# Patient Record
Sex: MALE | Race: WHITE | NOT HISPANIC OR LATINO | URBAN - METROPOLITAN AREA
[De-identification: names, ages, dates, MRNs, and addresses within clinical notes are randomized per-mention and may not be internally consistent; named-entity substitution may affect disease eponyms.]

---

## 2020-03-14 ENCOUNTER — TRANSFERRED RECORDS (OUTPATIENT)
Dept: HEALTH INFORMATION MANAGEMENT | Facility: CLINIC | Age: 45
End: 2020-03-14

## 2020-09-14 ENCOUNTER — TRANSFERRED RECORDS (OUTPATIENT)
Dept: HEALTH INFORMATION MANAGEMENT | Facility: CLINIC | Age: 45
End: 2020-09-14

## 2020-09-14 LAB
ALT SERPL-CCNC: 14 IU/L (ref 7–52)
AST SERPL-CCNC: 10 IU/L (ref 13–39)
CREATININE (EXTERNAL): 0.79 MG/DL (ref 0.7–1.3)
GFR ESTIMATED (EXTERNAL): >90 ML/MIN/1.73M2
GFR ESTIMATED (IF AFRICAN AMERICAN) (EXTERNAL): >90 ML/MIN/1.73M2
GLUCOSE (EXTERNAL): 97 MG/DL (ref 70–105)
POTASSIUM (EXTERNAL): 2.9 MEQ/L (ref 3.5–5.1)

## 2020-09-30 ENCOUNTER — TRANSFERRED RECORDS (OUTPATIENT)
Dept: HEALTH INFORMATION MANAGEMENT | Facility: CLINIC | Age: 45
End: 2020-09-30

## 2020-11-23 ENCOUNTER — TRANSFERRED RECORDS (OUTPATIENT)
Dept: HEALTH INFORMATION MANAGEMENT | Facility: CLINIC | Age: 45
End: 2020-11-23

## 2021-09-29 ENCOUNTER — TRANSFERRED RECORDS (OUTPATIENT)
Dept: HEALTH INFORMATION MANAGEMENT | Facility: CLINIC | Age: 46
End: 2021-09-29

## 2022-02-03 ENCOUNTER — MEDICAL CORRESPONDENCE (OUTPATIENT)
Dept: HEALTH INFORMATION MANAGEMENT | Facility: CLINIC | Age: 47
End: 2022-02-03

## 2022-02-11 ENCOUNTER — TRANSFERRED RECORDS (OUTPATIENT)
Dept: HEALTH INFORMATION MANAGEMENT | Facility: CLINIC | Age: 47
End: 2022-02-11

## 2022-03-08 ENCOUNTER — MEDICAL CORRESPONDENCE (OUTPATIENT)
Dept: HEALTH INFORMATION MANAGEMENT | Facility: CLINIC | Age: 47
End: 2022-03-08

## 2022-03-17 ENCOUNTER — TRANSFERRED RECORDS (OUTPATIENT)
Dept: HEALTH INFORMATION MANAGEMENT | Facility: CLINIC | Age: 47
End: 2022-03-17

## 2022-03-24 ENCOUNTER — TRANSFERRED RECORDS (OUTPATIENT)
Dept: HEALTH INFORMATION MANAGEMENT | Facility: CLINIC | Age: 47
End: 2022-03-24

## 2022-03-24 LAB
ALT SERPL-CCNC: 7 IU/L (ref 7–52)
AST SERPL-CCNC: 8 IU/L (ref 13–39)
CREATININE (EXTERNAL): 0.85 MG/DL (ref 0.7–1.3)
GFR ESTIMATED (EXTERNAL): >90 ML/MIN/1.73M2
GFR ESTIMATED (IF AFRICAN AMERICAN) (EXTERNAL): >90 ML/MIN/1.73M2
GLUCOSE (EXTERNAL): 80 MG/DL (ref 70–105)
POTASSIUM (EXTERNAL): 3.4 MEQ/L (ref 3.5–5.1)

## 2022-03-27 ENCOUNTER — MEDICAL CORRESPONDENCE (OUTPATIENT)
Dept: HEALTH INFORMATION MANAGEMENT | Facility: CLINIC | Age: 47
End: 2022-03-27

## 2022-04-06 ENCOUNTER — TRANSCRIBE ORDERS (OUTPATIENT)
Dept: OTHER | Age: 47
End: 2022-04-06

## 2022-04-06 DIAGNOSIS — R93.3 ABNORMAL FINDINGS ON DIAGNOSTIC IMAGING OF OTHER PARTS OF DIGESTIVE TRACT: Primary | ICD-10-CM

## 2022-04-07 ENCOUNTER — TRANSFERRED RECORDS (OUTPATIENT)
Dept: HEALTH INFORMATION MANAGEMENT | Facility: CLINIC | Age: 47
End: 2022-04-07

## 2022-04-08 ENCOUNTER — MEDICAL CORRESPONDENCE (OUTPATIENT)
Dept: HEALTH INFORMATION MANAGEMENT | Facility: CLINIC | Age: 47
End: 2022-04-08

## 2022-07-14 NOTE — TELEPHONE ENCOUNTER
REFERRAL INFORMATION:    Referring Provider:  Corry    Referring Clinic:  [facility]    Reason for Visit/Diagnosis: abnormal findings on digestive tract     FUTURE VISIT INFORMATION:    Appointment Date: 7.26.22    Appointment Time: 3 PM     NOTES STATUS DETAILS   OFFICE NOTE from Referring Provider Received [facility]  4.7.22  2.11.22   OFFICE NOTE from Other Specialist N/A    HOSPITAL DISCHARGE SUMMARY/  ED VISITS N/A    OPERATIVE REPORT N/A    MEDICATION LIST Received         ENDOSCOPY  Received 9.30.20   COLONOSCOPY Received 9.30.20   ERCP N/A    EUS N/A    STOOL TESTING N/A    PERTINENT LABS Received    PATHOLOGY REPORTS (RELATED) Received 9.30.20  3.17.22   IMAGING (CT, MRI, EGD, MRCP, Small Bowel Follow Through/SBT, MR/CT Enterography) Received  Enterography 11.23.20  MR 2.25.22     Action 7.14.22 MJ   Action Taken Called [facility] [phone] and received [employer] fax number [phone] . Requested images.      7/15/2022 1:05pm Images from [facility] resolved into PACS. Mónica

## 2022-07-18 ENCOUNTER — PATIENT OUTREACH (OUTPATIENT)
Dept: GASTROENTEROLOGY | Facility: CLINIC | Age: 47
End: 2022-07-18

## 2022-07-18 NOTE — TELEPHONE ENCOUNTER
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

## 2022-07-25 NOTE — TELEPHONE ENCOUNTER
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 I am working on the issues and will follow up with them tomorrow. The slides and images will not be here in time for the Pt's appt. Please let me know if you have any questions    Patient has appointment on July 26   Will go ahead with the appt

## 2022-07-26 ENCOUNTER — OFFICE VISIT (OUTPATIENT)
Dept: GASTROENTEROLOGY | Facility: CLINIC | Age: 47
End: 2022-07-26
Payer: COMMERCIAL

## 2022-07-26 ENCOUNTER — LAB (OUTPATIENT)
Dept: LAB | Facility: CLINIC | Age: 47
End: 2022-07-26
Payer: COMMERCIAL

## 2022-07-26 ENCOUNTER — PRE VISIT (OUTPATIENT)
Dept: GASTROENTEROLOGY | Facility: CLINIC | Age: 47
End: 2022-07-26

## 2022-07-26 VITALS
HEART RATE: 74 BPM | DIASTOLIC BLOOD PRESSURE: 70 MMHG | TEMPERATURE: 98.7 F | WEIGHT: 213.7 LBS | SYSTOLIC BLOOD PRESSURE: 116 MMHG | BODY MASS INDEX: 30.59 KG/M2 | HEIGHT: 70 IN

## 2022-07-26 DIAGNOSIS — R19.7 DIARRHEA, UNSPECIFIED TYPE: Primary | ICD-10-CM

## 2022-07-26 DIAGNOSIS — R19.7 DIARRHEA, UNSPECIFIED TYPE: ICD-10-CM

## 2022-07-26 DIAGNOSIS — R93.3 ABNORMAL FINDINGS ON DIAGNOSTIC IMAGING OF OTHER PARTS OF DIGESTIVE TRACT: ICD-10-CM

## 2022-07-26 LAB
ALBUMIN SERPL-MCNC: 3.3 G/DL (ref 3.4–5)
ALP SERPL-CCNC: 63 U/L (ref 40–150)
ALT SERPL W P-5'-P-CCNC: 14 U/L (ref 0–70)
ANION GAP SERPL CALCULATED.3IONS-SCNC: <1 MMOL/L (ref 3–14)
AST SERPL W P-5'-P-CCNC: 8 U/L (ref 0–45)
BASOPHILS # BLD AUTO: 0.1 10E3/UL (ref 0–0.2)
BASOPHILS NFR BLD AUTO: 1 %
BILIRUB SERPL-MCNC: 0.5 MG/DL (ref 0.2–1.3)
BUN SERPL-MCNC: 12 MG/DL (ref 7–30)
CALCIUM SERPL-MCNC: 8.5 MG/DL (ref 8.5–10.1)
CHLORIDE BLD-SCNC: 111 MMOL/L (ref 94–109)
CO2 SERPL-SCNC: 29 MMOL/L (ref 20–32)
CREAT SERPL-MCNC: 0.85 MG/DL (ref 0.66–1.25)
CRP SERPL-MCNC: 5.2 MG/L (ref 0–8)
EOSINOPHIL # BLD AUTO: 0.3 10E3/UL (ref 0–0.7)
EOSINOPHIL NFR BLD AUTO: 2 %
ERYTHROCYTE [DISTWIDTH] IN BLOOD BY AUTOMATED COUNT: 14 % (ref 10–15)
ERYTHROCYTE [SEDIMENTATION RATE] IN BLOOD BY WESTERGREN METHOD: 8 MM/HR (ref 0–15)
FERRITIN SERPL-MCNC: 30 NG/ML (ref 26–388)
GFR SERPL CREATININE-BSD FRML MDRD: >90 ML/MIN/1.73M2
GLUCOSE BLD-MCNC: 94 MG/DL (ref 70–99)
HCT VFR BLD AUTO: 42.6 % (ref 40–53)
HGB BLD-MCNC: 13.5 G/DL (ref 13.3–17.7)
IMM GRANULOCYTES # BLD: 0.1 10E3/UL
IMM GRANULOCYTES NFR BLD: 1 %
INR PPP: 0.97 (ref 0.85–1.15)
IRON SATN MFR SERPL: 17 % (ref 15–46)
IRON SERPL-MCNC: 44 UG/DL (ref 35–180)
LYMPHOCYTES # BLD AUTO: 1.8 10E3/UL (ref 0.8–5.3)
LYMPHOCYTES NFR BLD AUTO: 16 %
MCH RBC QN AUTO: 25.3 PG (ref 26.5–33)
MCHC RBC AUTO-ENTMCNC: 31.7 G/DL (ref 31.5–36.5)
MCV RBC AUTO: 80 FL (ref 78–100)
MONOCYTES # BLD AUTO: 0.5 10E3/UL (ref 0–1.3)
MONOCYTES NFR BLD AUTO: 5 %
NEUTROPHILS # BLD AUTO: 8.5 10E3/UL (ref 1.6–8.3)
NEUTROPHILS NFR BLD AUTO: 75 %
NRBC # BLD AUTO: 0 10E3/UL
NRBC BLD AUTO-RTO: 0 /100
PLATELET # BLD AUTO: 168 10E3/UL (ref 150–450)
POTASSIUM BLD-SCNC: 3.7 MMOL/L (ref 3.4–5.3)
PROT SERPL-MCNC: 6.1 G/DL (ref 6.8–8.8)
RBC # BLD AUTO: 5.33 10E6/UL (ref 4.4–5.9)
SODIUM SERPL-SCNC: 137 MMOL/L (ref 133–144)
TIBC SERPL-MCNC: 260 UG/DL (ref 240–430)
TSH SERPL DL<=0.005 MIU/L-ACNC: 0.7 MU/L (ref 0.4–4)
WBC # BLD AUTO: 11.3 10E3/UL (ref 4–11)

## 2022-07-26 PROCEDURE — 86481 TB AG RESPONSE T-CELL SUSP: CPT | Mod: 90 | Performed by: PATHOLOGY

## 2022-07-26 PROCEDURE — 80050 GENERAL HEALTH PANEL: CPT | Performed by: PATHOLOGY

## 2022-07-26 PROCEDURE — 83550 IRON BINDING TEST: CPT | Performed by: PATHOLOGY

## 2022-07-26 PROCEDURE — 85652 RBC SED RATE AUTOMATED: CPT | Performed by: PATHOLOGY

## 2022-07-26 PROCEDURE — 82306 VITAMIN D 25 HYDROXY: CPT | Mod: 90 | Performed by: PATHOLOGY

## 2022-07-26 PROCEDURE — 87340 HEPATITIS B SURFACE AG IA: CPT | Mod: 90 | Performed by: PATHOLOGY

## 2022-07-26 PROCEDURE — 84446 ASSAY OF VITAMIN E: CPT | Mod: 90 | Performed by: PATHOLOGY

## 2022-07-26 PROCEDURE — 82728 ASSAY OF FERRITIN: CPT | Performed by: PATHOLOGY

## 2022-07-26 PROCEDURE — 99204 OFFICE O/P NEW MOD 45 MIN: CPT | Mod: GC | Performed by: STUDENT IN AN ORGANIZED HEALTH CARE EDUCATION/TRAINING PROGRAM

## 2022-07-26 PROCEDURE — 84590 ASSAY OF VITAMIN A: CPT | Mod: 90 | Performed by: PATHOLOGY

## 2022-07-26 PROCEDURE — 86140 C-REACTIVE PROTEIN: CPT | Performed by: PATHOLOGY

## 2022-07-26 PROCEDURE — 85610 PROTHROMBIN TIME: CPT | Performed by: PATHOLOGY

## 2022-07-26 PROCEDURE — 82657 ENZYME CELL ACTIVITY: CPT | Mod: 90 | Performed by: PATHOLOGY

## 2022-07-26 PROCEDURE — 86706 HEP B SURFACE ANTIBODY: CPT | Mod: 90 | Performed by: PATHOLOGY

## 2022-07-26 PROCEDURE — 36415 COLL VENOUS BLD VENIPUNCTURE: CPT | Performed by: PATHOLOGY

## 2022-07-26 PROCEDURE — 99000 SPECIMEN HANDLING OFFICE-LAB: CPT | Performed by: PATHOLOGY

## 2022-07-26 PROCEDURE — 86704 HEP B CORE ANTIBODY TOTAL: CPT | Mod: 90 | Performed by: PATHOLOGY

## 2022-07-26 RX ORDER — CITALOPRAM HYDROBROMIDE 40 MG/1
TABLET ORAL
COMMUNITY
Start: 2021-07-29 | End: 2022-07-30

## 2022-07-26 RX ORDER — VITAMIN B COMPLEX
TABLET ORAL
COMMUNITY
Start: 2022-06-16 | End: 2022-07-30

## 2022-07-26 RX ORDER — BUDESONIDE 3 MG/1
CAPSULE, COATED PELLETS ORAL
COMMUNITY
Start: 2022-03-17 | End: 2023-03-18

## 2022-07-26 RX ORDER — ATENOLOL 50 MG/1
TABLET ORAL
COMMUNITY
Start: 2022-06-21

## 2022-07-26 RX ORDER — HYDROCHLOROTHIAZIDE 25 MG/1
TABLET ORAL
COMMUNITY
Start: 2022-06-16 | End: 2022-07-30

## 2022-07-26 RX ORDER — CHOLESTYRAMINE 4 G/5.5G
POWDER, FOR SUSPENSION ORAL
COMMUNITY
Start: 2022-02-11 | End: 2022-11-15

## 2022-07-26 RX ORDER — POTASSIUM CHLORIDE 1500 MG/1
TABLET, EXTENDED RELEASE ORAL
COMMUNITY
Start: 2021-07-29 | End: 2022-07-30

## 2022-07-26 RX ORDER — CHLORAL HYDRATE 500 MG
2000 CAPSULE ORAL
COMMUNITY
Start: 2022-06-16

## 2022-07-26 RX ORDER — IBUPROFEN 800 MG/1
TABLET, FILM COATED ORAL
COMMUNITY
Start: 2022-03-16 | End: 2023-03-18

## 2022-07-26 ASSESSMENT — PAIN SCALES - GENERAL: PAINLEVEL: NO PAIN (0)

## 2022-07-26 NOTE — NURSING NOTE
"Chief Complaint   Patient presents with     New Patient     Crohn's?       Vitals:    07/26/22 1332   BP: 116/70   BP Location: Left arm   Patient Position: Sitting   Cuff Size: Adult Large   Pulse: 74   Temp: 98.7  F (37.1  C)   Weight: 96.9 kg (213 lb 11.2 oz)   Height: 1.778 m (5' 10\")       Body mass index is 30.66 kg/m .      Dia Rubio LPN                      "

## 2022-07-26 NOTE — PATIENT INSTRUCTIONS
Mr. Amato,    It was a pleasure seeing you in the GI clinic today at the [facility]. Please see below for our plan.     Blood work today in clinic, the lab is on the 1st floor of this building  Stool/fecal labs today, we will give you two containers to take home, if you are able to produce a stool before you leave for [place], you can drop these back off at the clinic building in the morning, otherwise we will arrange to have them dropped off in [place]  We would like to schedule a colonoscopy here at the [facility] with Dr. Montoya, our clinic nurse manager will call you to help arrange this  We would like you to have a special MRI that takes a very close look at your small intestines  We would like to taper you from your budesonide as follows:  9 mg (three pills) per day x 1 week, 6 mg (two pills) per day x 4 weeks, 3 mg (one pill) per day x 4 weeks  We will follow up in clinic in the next 4 months when all of this of is complete and we will go over the results    Please do not hesitate to reach out with any questions or concerns,     Best,  Dr. Todd and Dr. Montoya       ==========  It was a pleasure seeing you in clinic today - please be in touch if there are any further questions that arise following today's visit.  During business hours, you may reach my Clinic Coordinator at [phone].  For urgent/emergent questions after business hours, you may reach the on-call GI Fellow by contacting the [facility]  at [phone].    Any benign/non-urgent test results are usually communicated via letter or [employer] message within 1-2 weeks after completion.  Urgent results (those that require a change in the previously-discussed care plan) are usually communicated via a phone call once available from our clinic staff to discuss the results and the next steps in your evaluation.    I recommend signing up for [employer] access if you have not already done so and are comfortable  with using a computer.  This allows for online access to your lab results and also helps you communicate efficiently with my clinic should any questions arise in your care.

## 2022-07-26 NOTE — PROGRESS NOTES
[facility] Gastroenterology Clinic:     New Consult: question of Crohn's    Date of visit: 7/26/2022     Referring provider: Sujata Wheatley    CC: 47 year old male referred by Sujata Wheatley for evaluation of possible IBD    ASSESSMENT AND PLAN:    #. Chronic diarrhea  #. Abdominal pain  #. Ileitis on imaging  Taken together, CTE findings of inflammation near ileum, chronic diarrhea and abdominal pain, and biopsies with immune infiltrate are most suspicious for new IBD, likely CD. Differential also includes NSAID enteropathy, lymphocytic enteropathy, malabsorption, pancreatic exocrine insufficiency, osmotic diarrhea from diet. We will move forward with broad laboratory, imaging, and endoscopic work up with particular attention to small bowel disease.   -- labs: CBC, CMP, iron panel, vitamins D, A, E, INR, TSH  -- stool testing: fecal calprotectin, fecal lactoferrin  -- MR enterography  -- Colonoscopy with Dr. Montoya  -- budesonide taper: 9mg, 6 mg, 3 mg, off  -- continue loperamide and soluble fiber  -- follow up in GI clinic in 4 months, sooner PRN    Return to Clinic: 4 months    Antoni Todd MD, PGY5  Gastroenterology Fellow  [facility]  See [facility]/Isabel for GI on-call information    Patient discussed and seen with Gastroenterology staff Dr. Montoya, who is in agreement with the above.     ====================================================================================================================================  HPI:     Mr. Amato presents today for evaluation of chronic diarrhea, abdominal pain, and endoscopic/imaging findings of ileitis.     Diarrhea for 5+ years, no GI symptoms or illness prior to that.     Bowel movement behavior:  - severity unchanged in last 5 years  - frequency: 2-3 times per day  - nocturnal: not typically  - blood: none  - dyschezia: none  - abdominal pain: during and prior to BMs, some small relief with passage of BMs  - fecal incontinence: once every other  month  - stool consistency: water with solid pieces  - associated symptoms: (-) nausea, vomiting, dysphagia, constipated, (+) weight loss ~ 20 lbs    Imodium (loperamide generic) 4 mg in AM, 2mg PM as needed.Recent course of budesonide, 9mg per day, started 3/2022. One prior course a few years ago, has been helpful.     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 Is 100% service covered for his [facility] health eligibility. Some radioactive exposure during explosive detection with certain devices.     Patient consents for [facility] for [facility] of care.     Family history:  - IBD: negative   - CRC: negative  - Celiac: negative  -- Autoimmune disease: negative    IBD ROS:  - Negative: joint pain, rashes, eye redness, oral sores, vision changes    Social:  - Tobacco: 1.5 ppd x 2 years, quit for 3 years, total 27-28 years  - EtOH: < 5 drinks per month  - No recreational drug use  - Works in oil field, [employer]  - Lives: [place]    NSAIDs: uses 800 mg ibuprofen PRN for shoulder and back pain, less than 3 times per week they are actually used    Diet: simply carbohydrates, though no dense source of sugar alcohols or artificial sweeteners    Abdominal surgery history: none    Targeted GI review of systems complete and is otherwise negative.     No past medical history on file.    No past surgical history on file.    No family history on file.    Social History     Tobacco Use     Smoking status: Not on file     Smokeless tobacco: Not on file   Substance Use Topics     Alcohol use: Not on file     Physical exam:     Vitals:There were no vitals taken for this visit.   BMI: There is no height or weight on file to calculate BMI.      GEN: NAD, A&Ox3, appropriate  HEENT: EOMI, PERRLA, MMM, hearing grossly intact  Neck: full ROM  Cardiopulmonary: non labored, comfortable on RA, nondiaphoretic, no LE edema  Abdominal: soft, tender in lower quadrants, R>L  nondistended, no HSM, no rebound, no guarding, normoactive BS  Skin: no jaundice, no gross lesions on visible skin  Neuro: A&Ox3, grossly intact motor/sensation, gait intact  MSK: no gross deformity, moves arms/legs equally  Psych: normal affect, congruent with mood      Labs:   Reviewed.     Relevant imaging:   CTE: abnormal mural hyperenhancementt of distal ileum and associated mesentery/LAD    Endoscopy:  Colonoscopy 3/2022: with possible thickening/inflammation in ileum    Pathology:   - TI biopsy from above colonoscopy: lymphocytic infiltrate, rectum and cecum normal    Relevant surgical reports:   N/A

## 2022-07-26 NOTE — LETTER
7/26/2022         RE: Jacinda Amato  [address] [facility] [address]        Dear Colleague,    Thank you for referring your patient, Jacinda Amato, to the [facility]STR[facility] [place]. Please see a copy of my visit note below.    [facility] Gastroenterology Clinic:     New Consult: question of Crohn's    Date of visit: 7/26/2022     Referring provider: Sujata Wheatley    CC: 47 year old male referred by Sujata Wheatley for evaluation of possible IBD    ASSESSMENT AND PLAN:    #. Chronic diarrhea  #. Abdominal pain  #. Ileitis on imaging  Taken together, CTE findings of inflammation near ileum, chronic diarrhea and abdominal pain, and biopsies with immune infiltrate are most suspicious for new IBD, likely CD. Differential also includes NSAID enteropathy, lymphocytic enteropathy, malabsorption, pancreatic exocrine insufficiency, osmotic diarrhea from diet. We will move forward with broad laboratory, imaging, and endoscopic work up with particular attention to small bowel disease.   -- labs: CBC, CMP, iron panel, vitamins D, A, E, INR, TSH  -- stool testing: fecal calprotectin, fecal lactoferrin  -- MR enterography  -- Colonoscopy with Dr. Montoya  -- budesonide taper: 9mg, 6 mg, 3 mg, off  -- continue loperamide and soluble fiber  -- follow up in GI clinic in 4 months, sooner PRN    Return to Clinic: 4 months    Antoni Todd MD, PGY5  Gastroenterology Fellow  [facility]  See [facility]abel for GI on-call information    Patient discussed and seen with Gastroenterology staff Dr. Montoya, who is in agreement with the above.     ====================================================================================================================================  HPI:     Mr. Amato presents today for evaluation of chronic diarrhea, abdominal pain, and endoscopic/imaging findings of ileitis.     Diarrhea for 5+ years, no GI symptoms or illness prior to that.     Bowel movement  behavior:  - severity unchanged in last 5 years  - frequency: 2-3 times per day  - nocturnal: not typically  - blood: none  - dyschezia: none  - abdominal pain: during and prior to BMs, some small relief with passage of BMs  - fecal incontinence: once every other month  - stool consistency: water with solid pieces  - associated symptoms: (-) nausea, vomiting, dysphagia, constipated, (+) weight loss ~ 20 lbs    Imodium (loperamide generic) 4 mg in AM, 2mg PM as needed.Recent course of budesonide, 9mg per day, started 3/2022. One prior course a few years ago, has been helpful.     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     Patient consents for me to access [facility]/VA records for continuity of care.     Family history:  - IBD: negative   - CRC: negative  - Celiac: negative  -- Autoimmune disease: negative    IBD ROS:  - Negative: joint pain, rashes, eye redness, oral sores, vision changes    Social:  - Tobacco: 1.5 ppd x 2 years, quit for 3 years, total 27-28 years  - EtOH: < 5 drinks per month  - No recreational drug use  - Works in oil field, [employer]  - Lives: [place]    NSAIDs: uses 800 mg ibuprofen PRN for shoulder and back pain, less than 3 times per week they are actually used    Diet: simply carbohydrates, though no dense source of sugar alcohols or artificial sweeteners    Abdominal surgery history: none    Targeted GI review of systems complete and is otherwise negative.     No past medical history on file.    No past surgical history on file.    No family history on file.    Social History     Tobacco Use     Smoking status: Not on file     Smokeless tobacco: Not on file   Substance Use Topics     Alcohol use: Not on file     Physical exam:     Vitals:There were no vitals taken for this visit.   BMI: There is no height or  weight on file to calculate BMI.      GEN: NAD, A&Ox3, appropriate  HEENT: EOMI, PERRLA, MMM, hearing grossly intact  Neck: full ROM  Cardiopulmonary: non labored, comfortable on RA, nondiaphoretic, no LE edema  Abdominal: soft, tender in lower quadrants, R>L nondistended, no HSM, no rebound, no guarding, normoactive BS  Skin: no jaundice, no gross lesions on visible skin  Neuro: A&Ox3, grossly intact motor/sensation, gait intact  MSK: no gross deformity, moves arms/legs equally  Psych: normal affect, congruent with mood      Labs:   Reviewed.     Relevant imaging:   CTE: abnormal mural hyperenhancementt of distal ileum and associated mesentery/LAD    Endoscopy:  Colonoscopy 3/2022: with possible thickening/inflammation in ileum    Pathology:   - TI biopsy from above colonoscopy: lymphocytic infiltrate, rectum and cecum normal    Relevant surgical reports:   N/A                Attestation signed by Suman Montoya MD at 8/12/2022 12:22 PM:  ATTENDING ATTESTATION:     DATE SEEN: 7/26/22    Patient was discussed, seen, and examined by meSuman. The plan of care and pertinent data/imaging were reviewed with Dr. Todd. I agree with the assessment and plan as delineated above with the following additions:     Obtain labs, stool tests, MRE and colonoscopy. Budesonide taper as outlined.    Please contact me with any further questions.    Thank you for this consultation.  It was a pleasure to participate in the care of this patient; please contact us with any further questions.  I spent a total of 40 minutes during the day of encounter performed chart review, meeting with patient, patient counseling, care coordination, and documentation.     Suman Montoya MD  [facility]  Division of Gastroenterology, Hepatology and Nutrition     Brief GI Telephone Note:    07/27/22    All available labs from 7/27/22 reviewed with patient. Understands results and all questions answered. He is dropping off  fecal calprotectin and stool elastase as we speak.     Next steps in care understood.     Antoni Todd MD, PGY5  Gastroenterology Fellow  [facility]  See [facility]/bryn for GI on-call information      Brief GI Telephone Note:    08/05/22    Unable to reach patient on multiple attempts today to discuss results of fecal calprotectin (elevated).    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    Antoni Todd MD, PGY5  Gastroenterology Fellow  [facility]  See [facility]abel for GI on-call information

## 2022-07-27 LAB
DEPRECATED CALCIDIOL+CALCIFEROL SERPL-MC: 39 UG/L (ref 20–75)
HBV CORE AB SERPL QL IA: NONREACTIVE
HBV SURFACE AB SERPL IA-ACNC: 1 M[IU]/ML
HBV SURFACE AG SERPL QL IA: NONREACTIVE

## 2022-07-27 PROCEDURE — 83993 ASSAY FOR CALPROTECTIN FECAL: CPT | Mod: 90 | Performed by: PATHOLOGY

## 2022-07-27 PROCEDURE — 99000 SPECIMEN HANDLING OFFICE-LAB: CPT | Performed by: PATHOLOGY

## 2022-07-27 PROCEDURE — 82653 EL-1 FECAL QUANTITATIVE: CPT | Mod: 90 | Performed by: PATHOLOGY

## 2022-07-27 NOTE — PROGRESS NOTES
Brief GI Telephone Note:    07/27/22    All available labs from 7/27/22 reviewed with patient. Understands results and all questions answered. He is dropping off fecal calprotectin and stool elastase as we speak.     Next steps in care understood.     Antoni Todd MD, PGY5  Gastroenterology Fellow  [facility]  See [facility]abel for GI on-call information

## 2022-07-28 LAB
GAMMA INTERFERON BACKGROUND BLD IA-ACNC: 0.02 IU/ML
M TB IFN-G BLD-IMP: NEGATIVE
M TB IFN-G CD4+ BCKGRND COR BLD-ACNC: 9.98 IU/ML
MITOGEN IGNF BCKGRD COR BLD-ACNC: -0.01 IU/ML
MITOGEN IGNF BCKGRD COR BLD-ACNC: 0 IU/ML
QUANTIFERON MITOGEN: 10 IU/ML
QUANTIFERON NIL TUBE: 0.02 IU/ML
QUANTIFERON TB1 TUBE: 0.01 IU/ML
QUANTIFERON TB2 TUBE: 0.02

## 2022-07-29 ENCOUNTER — DOCUMENTATION ONLY (OUTPATIENT)
Dept: GASTROENTEROLOGY | Facility: CLINIC | Age: 47
End: 2022-07-29

## 2022-07-29 LAB
A-TOCOPHEROL VIT E SERPL-MCNC: 16.4 MG/L
ANNOTATION COMMENT IMP: NORMAL
BETA+GAMMA TOCOPHEROL SERPL-MCNC: 1.7 MG/L
CALPROTECTIN STL-MCNT: 816 MG/KG (ref 0–49.9)
ELASTASE PANC STL-MCNT: 519 UG/G
RETINYL PALMITATE SERPL-MCNC: 0.02 MG/L
TPMT BLD-CCNC: 32.1 U/ML
VIT A SERPL-MCNC: 0.86 MG/L

## 2022-07-29 NOTE — PROGRESS NOTES
Action 2022 2:30pm -Bhao    Action Taken Fax request sent to [facility] for colored colonoscopy (3/17/2022, 2022) images and Path slides.    [facility]  Medical Records  [address] [place] [address]   [phone]  [employer]         Action 2022 11:39am -Bhao   Action Taken Re-faxed request to [facility] for images and path slides.    [employer]

## 2022-08-02 ENCOUNTER — TELEPHONE (OUTPATIENT)
Dept: GASTROENTEROLOGY | Facility: CLINIC | Age: 47
End: 2022-08-02

## 2022-08-02 NOTE — TELEPHONE ENCOUNTER
Brief GI Phone note:    08/02/22      Attempted to call patient to discuss further results. Left VM. Will attempt later today/tomorrow.     Antoni Todd MD, PGY5  Gastroenterology Fellow  [facility]  See [facility]abel for GI on-call information

## 2022-08-05 NOTE — PROGRESS NOTES
Brief GI Telephone Note:    08/05/22    Unable to reach patient on multiple attempts today to discuss results of fecal calprotectin (elevated).    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    Antoni Todd MD, PGY5  Gastroenterology Fellow  [facility]  See [facility]abel for GI on-call information

## 2022-08-15 ENCOUNTER — TELEPHONE (OUTPATIENT)
Dept: GASTROENTEROLOGY | Facility: CLINIC | Age: 47
End: 2022-08-15

## 2022-08-15 ENCOUNTER — PATIENT OUTREACH (OUTPATIENT)
Dept: GASTROENTEROLOGY | Facility: CLINIC | Age: 47
End: 2022-08-15

## 2022-08-15 NOTE — TELEPHONE ENCOUNTER
Screening Questions    BlueKIND OF PREP RedLOCATION [review exclusion criteria] GreenSEDATION TYPE      1. Are you active on mychart? Y    2. What insurance is in the chart? [facility]     3.   Ordering/Referring Provider: Suman Montoya MD     4. BMI   (If greater than 40 review exclusion criteria [PAC APPT IF [MAC] @ [facility])  30.6  [If yes, BMI OVER 40-EXTENDED PREP]      **(Sedation review/consideration needed)**  Do you have a legal guardian or Medical Power of    and/or are you able to give consent for your medical care?     Y    5. Have you had a positive covid test in the last 90 days?   N -     6.  Are you currently on dialysis?   N [ If yes, G-PREP & HOSPITAL setting ONLY]     7.  Do you have chronic kidney disease?  N [ If yes, G-PREP ]    8.   Do you have a diagnosis of diabetes?   N   [ If yes, G-PREP ]    9.  On a regular basis do you go 3-5 days between bowel movements?   N   [ If yes, EXTENDED PREP]    10.  Are you taking any prescription pain medications on a routine schedule?    N -  [ If yes, EXTENDED PREP] [If yes, MAC]      11.   Do you have any chemical dependencies such as alcohol, street drugs, or methadone?    N [If yes, MAC]    12.   Do you have any history of post-traumatic stress syndrome, severe anxiety or history of psychosis?    Y  [If yes, MAC]    13.  [FEMALES] Are you currently pregnant? NA    If yes, how many weeks?       Respiratory/Heart Screening:  [If yes to any of the following HOSPITAL setting only]     14. Do you have Pulmonary Hypertension [Lungs]?   N       15. Do you have UNCONTROLLED asthma?   N     16.  Do you use daily home oxygen?  N      17. Do you have mod to severe Obstructive Sleep Apnea?         (OKAY @ [facility]  [facility]  SH  PH  RI  MG - if pt is not on OXYGEN)  N      18.   Have you had a heart or lung transplant?   N      19.   Have you had a stroke or Transient ischemic attack (TIA - aka  mini stroke ) within 6 months?  (If yes, please review exclusion  criteria)  N     20.   In the past 6 months, have you had any heart related issues including cardiomyopathy or heart attack?   N           If yes, did it require cardiac stenting or other implantable device?   NA      21.   Do you have any implantable devices in your body (pacemaker, defib, LVAD)? (If yes, please review exclusion criteria)  N     22.  Do you take the medication Phentermine?  NO        23. Do you take nitroglycerin?   N           If yes, how often? NA  (if yes, HOSPITAL setting ONLY)    24.  Are you currently taking any blood thinners?    [If yes, INFORM patient to follw  w/ ORDERING PROVIDER FOR BRIDGING INSTRUCTIONS]     N    25.   Do you transfer independently?                (If NO, please HOSPITAL setting ONLY)  Y    26.   Preferred LOCAL Pharmacy for Pre Prescription:      [id number] - [place], [place] - [address]    Scheduling Details  (Please ask for phone number if not scheduled by patient)      Caller : Jacinda Amato    Date of Procedure: 10/31  Surgeon: Jan  Location: [facility]        Sedation Type: MAC l Per Protocol  Conscious Sedation- Needs  for 6 hours after the procedure  MAC/General-Needs  for 24 hours after procedure    N :[Pre-op Required] at [facility]  SH  MG and OR for MAC sedation   (advise patient they will need a pre-op WITH IN 30 DAYS of procedure date)     Type of Procedure Scheduled:   Lower Endoscopy [Colonoscopy]    Which Colonoscopy Prep was Sent?:   Magnesium Citrate Recall    KHORUTS CF PATIENTS & GROEN'S PATIENTS NEEDS EXTENDED PREP       Informed patient they will need an adult  Y  Cannot take any type of public or medical transportation alone    Pre-Procedure Covid test to be completed at [facility] or Externally: Y  **INFORMED OF HOME TESTING & LAB OPTION**        Confirmed Nurse will call to complete assessment Y    Additional comments:

## 2022-08-15 NOTE — TELEPHONE ENCOUNTER
Patient calls to work on a plan for colonoscopy, clinic appointment and mre  Patient has clinic appointment   Transferred to endoscopy scheduling   Patient has the number to call to schedule mre

## 2022-08-31 ENCOUNTER — TELEPHONE (OUTPATIENT)
Dept: GASTROENTEROLOGY | Facility: CLINIC | Age: 47
End: 2022-08-31

## 2022-08-31 DIAGNOSIS — R93.3 ABNORMAL FINDINGS ON DIAGNOSTIC IMAGING OF OTHER PARTS OF DIGESTIVE TRACT: Primary | ICD-10-CM

## 2022-08-31 RX ORDER — BISACODYL 5 MG/1
TABLET, DELAYED RELEASE ORAL
Qty: 4 TABLET | Refills: 0 | Status: SHIPPED | OUTPATIENT
Start: 2022-08-31

## 2022-08-31 NOTE — TELEPHONE ENCOUNTER
Patient calling stating that they are wanting Golytely script sent to pharmacy now as [facility] and it can take a couple weeks to get.     Colonoscopy scheduled for 10/31/22 at [facility].    Golytely prep sent to [facility] - [place], [place] - [address] per request.     Patient also stated taht they have imaging after procedure and requesting to be transferred there as well. Transferred call per request.     Informed patient that endoscopy nursing will call closer to procedure to go over prep instructions. Patient agreeable to plan.     Deb Emery, RN

## 2022-09-09 ENCOUNTER — TELEPHONE (OUTPATIENT)
Dept: GASTROENTEROLOGY | Facility: CLINIC | Age: 47
End: 2022-09-09

## 2022-09-09 NOTE — TELEPHONE ENCOUNTER
Attempted to contact the patient to get more information, no answer. Left voicemail. Awaiting callback.

## 2022-09-09 NOTE — TELEPHONE ENCOUNTER
Received clinic forwarded call from the patient requesting a PA for his colonoscopy.  I did explain that the Central PA Team does not handle Procedure Pas - we refer back to the provider that is performing the procure; their care coordinators, nurses, FAY, MA would submit and handle the authorization request to the patient's insurance.     Sending this note back to the clinic to complete.     Garth Steve  Central Prior Authorization Team   Phone: [phone]

## 2022-09-09 NOTE — TELEPHONE ENCOUNTER
[facility] Call Center    Phone Message    May a detailed message be left on voicemail: yes     Reason for Call: Other: Patient called stating a [facility].  Please fax to: [phone], Attn: Mathew.  (A reqeust for service had been sent; however it was sent to the hospital that referred him - [facility] be [facility] the [facility], not [facility].)  Any questions, please follow up with patient.     Action Taken: Message routed to:  [facility]    Travel Screening: Not Applicable

## 2022-09-12 ENCOUNTER — PATIENT OUTREACH (OUTPATIENT)
Dept: GASTROENTEROLOGY | Facility: CLINIC | Age: 47
End: 2022-09-12

## 2022-09-12 NOTE — TELEPHONE ENCOUNTER
Contacted patient to discuss his concerns prior authorization for imaging and the colonoscopy   Informed patient that the colonoscopy is prior authorized   That the imaging will be prior authorized however the procedure is not until end of October     Imaging is very expensive and will not proceed with not covered by insurance   Patient had no further questions.

## 2022-10-03 ENCOUNTER — HEALTH MAINTENANCE LETTER (OUTPATIENT)
Age: 47
End: 2022-10-03

## 2022-10-21 ENCOUNTER — MYC MEDICAL ADVICE (OUTPATIENT)
Dept: GASTROENTEROLOGY | Facility: CLINIC | Age: 47
End: 2022-10-21

## 2022-10-25 ENCOUNTER — PATIENT OUTREACH (OUTPATIENT)
Dept: GASTROENTEROLOGY | Facility: CLINIC | Age: 47
End: 2022-10-25

## 2022-10-25 NOTE — TELEPHONE ENCOUNTER
Attempted to contact patient regarding upcoming colonoscopy procedure on 10.31.2022 for pre assessment questions. No answer.     Left message to return call to [phone] #4    Discuss at home rapid antigen COVID test 1-2 days prior to procedure.    Arrival time: 0910    Facility location: [facility]    Sedation type: MAC    Indication for procedure: diarrhea and ileitis, eval for IBD    LETICIA? Yes.  RN is unable to find a sleep study upon chart review.  Complete STOP BANG.    Bowel prep recommendation: Golytely d/t mg citrate recall    Prep was previously sent to pt's preferred pharmacy.    Prep instructions sent via [employer].     Faina Mancera RN

## 2022-10-25 NOTE — CONFIDENTIAL NOTE
Writer reached out to the imaging department to determine if the prior authorization for the MRE had gone through, per the patient's request. Received callback from PA team, who ran the prior auth this morning and informed writer that it went through and was approved. Updated patient via [employer].

## 2022-10-26 NOTE — TELEPHONE ENCOUNTER
Performed STOP/BANG. Pt scores a 5. Also wears a C-Pap mask to bed. He is declining to be moved to the [facility]. Will send staff message to anesthesiology for review.       Pre assessment questions completed for upcoming Colonoscopy procedure scheduled on 10/31/22    COVID policy reviewed. Patient to complete rapid antigen test one to two days before their scheduled procedure. Patient to bring photo of the results when they come in for their procedure.    Reviewed procedural arrival time 0910 and facility location [facility] [place].    Designated  policy reviewed. Instructed to have someone stay 24 hours post procedure.     Reviewed Colonoscopy prep instructions. No fiber/iron supplements or foods that contain nuts/seeds 7 days prior to procedure.     Anticoagulation/blood thinners? None    Electronic implanted devices? None    Patient verbalized understanding and had no questions or concerns at this time.    Olga Lidia Lambert RN

## 2022-10-27 NOTE — TELEPHONE ENCOUNTER
Called to remind patient of their upcoming appointment with our GI clinic, on Tuesday 11/1/2022 at 1:25PM with Dr. Todd. This appointment is scheduled as an in-person appt. Please arrive 15 minutes early to check in for your appointment. , if your appointment is virtual (video or telephone) you need to be in [place] for the visit. To reschedule or cancel patient to call [phone].    Praveena Davalos MA

## 2022-10-27 NOTE — TELEPHONE ENCOUNTER
"Per Manjeet Harvey CRNA  \"OK to stay on [facility] schedule\"     RN informed pt of message above.    Faina Mancera RN          "

## 2022-10-31 ENCOUNTER — ANESTHESIA (OUTPATIENT)
Dept: SURGERY | Facility: AMBULATORY SURGERY CENTER | Age: 47
End: 2022-10-31
Payer: COMMERCIAL

## 2022-10-31 ENCOUNTER — HOSPITAL ENCOUNTER (OUTPATIENT)
Facility: AMBULATORY SURGERY CENTER | Age: 47
Discharge: HOME | End: 2022-10-31
Attending: INTERNAL MEDICINE | Admitting: INTERNAL MEDICINE
Payer: COMMERCIAL

## 2022-10-31 ENCOUNTER — ANESTHESIA EVENT (OUTPATIENT)
Dept: SURGERY | Facility: AMBULATORY SURGERY CENTER | Age: 47
End: 2022-10-31
Payer: COMMERCIAL

## 2022-10-31 ENCOUNTER — ANCILLARY PROCEDURE (OUTPATIENT)
Dept: MRI IMAGING | Facility: CLINIC | Age: 47
End: 2022-10-31
Attending: INTERNAL MEDICINE
Payer: COMMERCIAL

## 2022-10-31 VITALS
OXYGEN SATURATION: 97 % | SYSTOLIC BLOOD PRESSURE: 110 MMHG | HEART RATE: 66 BPM | DIASTOLIC BLOOD PRESSURE: 58 MMHG | TEMPERATURE: 98.4 F | RESPIRATION RATE: 15 BRPM

## 2022-10-31 VITALS — HEART RATE: 72 BPM

## 2022-10-31 DIAGNOSIS — K52.9 ILEITIS: Primary | ICD-10-CM

## 2022-10-31 DIAGNOSIS — R93.3 ABNORMAL FINDINGS ON DIAGNOSTIC IMAGING OF OTHER PARTS OF DIGESTIVE TRACT: ICD-10-CM

## 2022-10-31 DIAGNOSIS — R19.7 DIARRHEA, UNSPECIFIED TYPE: ICD-10-CM

## 2022-10-31 LAB — COLONOSCOPY: NORMAL

## 2022-10-31 PROCEDURE — 74183 MRI ABD W/O CNTR FLWD CNTR: CPT | Performed by: RADIOLOGY

## 2022-10-31 PROCEDURE — 45380 COLONOSCOPY AND BIOPSY: CPT

## 2022-10-31 PROCEDURE — 88305 TISSUE EXAM BY PATHOLOGIST: CPT | Mod: 26 | Performed by: PATHOLOGY

## 2022-10-31 PROCEDURE — 72197 MRI PELVIS W/O & W/DYE: CPT | Performed by: RADIOLOGY

## 2022-10-31 PROCEDURE — A9585 GADOBUTROL INJECTION: HCPCS | Performed by: RADIOLOGY

## 2022-10-31 PROCEDURE — 88305 TISSUE EXAM BY PATHOLOGIST: CPT | Mod: TC | Performed by: INTERNAL MEDICINE

## 2022-10-31 RX ORDER — PROPOFOL 10 MG/ML
INJECTION, EMULSION INTRAVENOUS CONTINUOUS PRN
Status: DISCONTINUED | OUTPATIENT
Start: 2022-10-31 | End: 2022-10-31

## 2022-10-31 RX ORDER — ONDANSETRON 2 MG/ML
4 INJECTION INTRAMUSCULAR; INTRAVENOUS
Status: DISCONTINUED | OUTPATIENT
Start: 2022-10-31 | End: 2022-11-01 | Stop reason: HOSPADM

## 2022-10-31 RX ORDER — ONDANSETRON 4 MG/1
4 TABLET, ORALLY DISINTEGRATING ORAL EVERY 6 HOURS PRN
Status: CANCELLED | OUTPATIENT
Start: 2022-10-31

## 2022-10-31 RX ORDER — ONDANSETRON 2 MG/ML
4 INJECTION INTRAMUSCULAR; INTRAVENOUS EVERY 6 HOURS PRN
Status: CANCELLED | OUTPATIENT
Start: 2022-10-31

## 2022-10-31 RX ORDER — LIDOCAINE 40 MG/G
CREAM TOPICAL
Status: DISCONTINUED | OUTPATIENT
Start: 2022-10-31 | End: 2022-11-01 | Stop reason: HOSPADM

## 2022-10-31 RX ORDER — FLUMAZENIL 0.1 MG/ML
0.2 INJECTION, SOLUTION INTRAVENOUS
Status: CANCELLED | OUTPATIENT
Start: 2022-10-31 | End: 2022-10-31

## 2022-10-31 RX ORDER — GADOBUTROL 604.72 MG/ML
10 INJECTION INTRAVENOUS ONCE
Status: COMPLETED | OUTPATIENT
Start: 2022-10-31 | End: 2022-10-31

## 2022-10-31 RX ORDER — NALOXONE HYDROCHLORIDE 0.4 MG/ML
0.4 INJECTION, SOLUTION INTRAMUSCULAR; INTRAVENOUS; SUBCUTANEOUS
Status: CANCELLED | OUTPATIENT
Start: 2022-10-31

## 2022-10-31 RX ORDER — PROPOFOL 10 MG/ML
INJECTION, EMULSION INTRAVENOUS PRN
Status: DISCONTINUED | OUTPATIENT
Start: 2022-10-31 | End: 2022-10-31

## 2022-10-31 RX ORDER — NALOXONE HYDROCHLORIDE 0.4 MG/ML
0.2 INJECTION, SOLUTION INTRAMUSCULAR; INTRAVENOUS; SUBCUTANEOUS
Status: CANCELLED | OUTPATIENT
Start: 2022-10-31

## 2022-10-31 RX ORDER — PROCHLORPERAZINE MALEATE 10 MG
10 TABLET ORAL EVERY 6 HOURS PRN
Status: CANCELLED | OUTPATIENT
Start: 2022-10-31

## 2022-10-31 RX ORDER — SODIUM CHLORIDE, SODIUM LACTATE, POTASSIUM CHLORIDE, CALCIUM CHLORIDE 600; 310; 30; 20 MG/100ML; MG/100ML; MG/100ML; MG/100ML
INJECTION, SOLUTION INTRAVENOUS CONTINUOUS PRN
Status: DISCONTINUED | OUTPATIENT
Start: 2022-10-31 | End: 2022-10-31

## 2022-10-31 RX ADMIN — GADOBUTROL 10 ML: 604.72 INJECTION INTRAVENOUS at 15:19

## 2022-10-31 RX ADMIN — PROPOFOL 20 MG: 10 INJECTION, EMULSION INTRAVENOUS at 10:54

## 2022-10-31 RX ADMIN — PROPOFOL 30 MG: 10 INJECTION, EMULSION INTRAVENOUS at 11:29

## 2022-10-31 RX ADMIN — PROPOFOL 40 MG: 10 INJECTION, EMULSION INTRAVENOUS at 10:49

## 2022-10-31 RX ADMIN — PROPOFOL 30 MG: 10 INJECTION, EMULSION INTRAVENOUS at 11:27

## 2022-10-31 RX ADMIN — SODIUM CHLORIDE, SODIUM LACTATE, POTASSIUM CHLORIDE, CALCIUM CHLORIDE: 600; 310; 30; 20 INJECTION, SOLUTION INTRAVENOUS at 10:45

## 2022-10-31 RX ADMIN — PROPOFOL 150 MCG/KG/MIN: 10 INJECTION, EMULSION INTRAVENOUS at 10:48

## 2022-10-31 ASSESSMENT — LIFESTYLE VARIABLES: TOBACCO_USE: 1

## 2022-10-31 NOTE — ANESTHESIA POSTPROCEDURE EVALUATION
Patient: Jacinda Amato    Procedure: Procedure(s):  COLONOSCOPY, WITH BIOPSY       Anesthesia Type:  MAC    Note:  Disposition: Outpatient   Postop Pain Control: Uneventful            Sign Out: Well controlled pain   PONV: No   Neuro/Psych: Uneventful            Sign Out: Acceptable/Baseline neuro status   Airway/Respiratory: Uneventful            Sign Out: Acceptable/Baseline resp. status   CV/Hemodynamics: Uneventful            Sign Out: Acceptable CV status; No obvious hypovolemia; No obvious fluid overload   Other NRE: NONE   DID A NON-ROUTINE EVENT OCCUR? No           Last vitals:  Vitals Value Taken Time   /58 10/31/22 1155   Temp 36.9  C (98.4  F) 10/31/22 1155   Pulse 66 10/31/22 1155   Resp 15 10/31/22 1155   SpO2         Electronically Signed By: Lincoln Simmons MD  October 31, 2022  12:21 PM

## 2022-10-31 NOTE — ANESTHESIA PREPROCEDURE EVALUATION
Anesthesia Pre-Procedure Evaluation    Patient: Jacinda Amato   MRN: [id number] : 1975        Procedure : Procedure(s):  COLONOSCOPY, WITH BIOPSY          No past medical history on file.   History reviewed. No pertinent surgical history.   No Known Allergies   Social History     Tobacco Use     Smoking status: Every Day     Packs/day: 1.50     Types: Cigarettes     Smokeless tobacco: Never   Substance Use Topics     Alcohol use: Not on file      Wt Readings from Last 1 Encounters:   22 96.9 kg (213 lb 11.2 oz)        Anesthesia Evaluation            ROS/MED HX  ENT/Pulmonary:     (+) tobacco use, Current use,     Neurologic:       Cardiovascular:       METS/Exercise Tolerance:     Hematologic:       Musculoskeletal:       GI/Hepatic:       Renal/Genitourinary:       Endo:       Psychiatric/Substance Use:       Infectious Disease:       Malignancy:       Other:            Physical Exam    Airway  airway exam normal           Respiratory Devices and Support         Dental  no notable dental history         Cardiovascular   cardiovascular exam normal          Pulmonary   pulmonary exam normal                OUTSIDE LABS:  CBC:   Lab Results   Component Value Date    WBC 11.3 (H) 2022    HGB 13.5 2022    HCT 42.6 2022     2022     BMP:   Lab Results   Component Value Date     2022    POTASSIUM 3.7 2022    CHLORIDE 111 (H) 2022    CO2 29 2022    BUN 12 2022    CR 0.85 2022    GLC 94 2022     COAGS:   Lab Results   Component Value Date    INR 0.97 2022     POC: No results found for: BGM, HCG, HCGS  HEPATIC:   Lab Results   Component Value Date    ALBUMIN 3.3 (L) 2022    PROTTOTAL 6.1 (L) 2022    ALT 14 2022    AST 8 2022    ALKPHOS 63 2022    BILITOTAL 0.5 2022     OTHER:   Lab Results   Component Value Date    PENNY 8.5 2022    TSH 0.70 2022    CRP 5.2 2022    SED 8  07/26/2022       Anesthesia Plan    ASA Status:  2   NPO Status:  NPO Appropriate    Anesthesia Type: MAC.     - Reason for MAC: immobility needed   Induction: Intravenous.   Maintenance: TIVA.        Consents    Anesthesia Plan(s) and associated risks, benefits, and realistic alternatives discussed. Questions answered and patient/representative(s) expressed understanding.    - Discussed:     - Discussed with:  Patient      - Extended Intubation/Ventilatory Support Discussed: No.      - Patient is DNR/DNI Status: No    Use of blood products discussed: No .     Postoperative Care       PONV prophylaxis: Background Propofol Infusion     Comments:           H&P reviewed: Unable to attach H&P to encounter due to EHR limitations. H&P Update: appropriate H&P reviewed, patient examined. No interval changes since H&P (within 30 days).         Lincoln Simmons MD

## 2022-10-31 NOTE — H&P
Jacinda Mcelroyalexa  [id number]  male  47 year old      Reason for procedure/surgery: history of ileitis    There is no problem list on file for this patient.      Past Surgical History:  History reviewed. No pertinent surgical history.    Past Medical History: No past medical history on file.    Social History:   Social History     Tobacco Use     Smoking status: Every Day     Packs/day: 1.50     Types: Cigarettes     Smokeless tobacco: Never   Substance Use Topics     Alcohol use: Not on file       Family History: History reviewed. No pertinent family history.    Allergies: No Known Allergies    Active Medications:   Current Outpatient Medications   Medication Sig Dispense Refill     atenolol (TENORMIN) 50 MG tablet        bisacodyl (DULCOLAX) 5 MG EC tablet Take as directed. One day before exam take 2 tablets at 3 PM. Take 2 tablets at 11 PM. 4 tablet 0     budesonide (ENTOCORT EC) 3 MG EC capsule TAKE THREE CAPSULES BY MOUTH EVERY DAY **NF APPROVED**       citalopram (CELEXA) 40 MG tablet TAKE ONE TABLET BY MOUTH EVERY DAY FOR DEPRESSION       fish oil-omega-3 fatty acids 1000 MG capsule 2,000 mg       hydrochlorothiazide (HYDRODIURIL) 25 MG tablet TAKE ONE-HALF TABLET BY MOUTH EVERY MORNING FOR BLOOD PRESSURE       ibuprofen (ADVIL/MOTRIN) 800 MG tablet TAKE ONE TABLET BY MOUTH THREE TIMES A DAY AS NEEDED       omeprazole (PRILOSEC) 20 MG DR capsule        polyethylene glycol (GOLYTELY) 236 g suspension Take as directed. One day before exam fill the jug with water. Cover and shake until well mixed. At 6 PM start drinking an 8oz glass of mixture every 15 minutes until jug is 1/2 empty. Store remainder in the refrigerator.  At 11 PM Start drinking the other half of the Golytely jug. Drink one 8-ounce glass every 15 minutes until the jug is empty. 4000 mL 0     PREVALITE 4 g packet          Systemic Review:   CONSTITUTIONAL: NEGATIVE for fever, chills, change in weight  ENT/MOUTH: NEGATIVE for ear, mouth and throat  problems  RESP: NEGATIVE for significant cough or SOB  CV: NEGATIVE for chest pain, palpitations or peripheral edema    Physical Examination:   Vital Signs: /67   Pulse 79   Temp 96.9  F (36.1  C) (Temporal)   Resp 16   SpO2 97%   GENERAL: healthy, alert and no distress  NECK: no adenopathy, no asymmetry, masses, or scars  RESP: lungs clear to auscultation - no rales, rhonchi or wheezes  CV: regular rate and rhythm, normal S1 S2, no S3 or S4, no murmur, click or rub, no peripheral edema and peripheral pulses strong  ABDOMEN: soft, nontender, no hepatosplenomegaly, no masses and bowel sounds normal  MS: no gross musculoskeletal defects noted, no edema    Plan: Appropriate to proceed as scheduled.      Suman Montoya MD  10/31/2022    PCP:  No primary care provider on file.

## 2022-10-31 NOTE — ANESTHESIA CARE TRANSFER NOTE
Patient: Jacinda Amato    Procedure: Procedure(s):  COLONOSCOPY, WITH BIOPSY       Diagnosis: Abnormal findings on diagnostic imaging of other parts of digestive tract [R93.3]  Diagnosis Additional Information: No value filed.    Anesthesia Type:   No value filed.     Note:    Oropharynx: oropharynx clear of all foreign objects and spontaneously breathing  Level of Consciousness: drowsy  Oxygen Supplementation: room air    Independent Airway: airway patency satisfactory and stable  Dentition: dentition unchanged  Vital Signs Stable: post-procedure vital signs reviewed and stable  Report to RN Given: handoff report given  Patient transferred to: Phase II    Handoff Report: Identifed the Patient, Identified the Reponsible Provider, Reviewed the pertinent medical history, Discussed the surgical course, Reviewed Intra-OP anesthesia mangement and issues during anesthesia, Set expectations for post-procedure period and Allowed opportunity for questions and acknowledgement of understanding      Vitals:  Vitals Value Taken Time   BP 93/46 10/31/22 1138   Temp 36.9  C (98.4  F) 10/31/22 1138   Pulse 66 10/31/22 1138   Resp 16 10/31/22 1138   SpO2 94        Electronically Signed By: [place] Jacob CRNA  October 31, 2022  11:39 AM

## 2022-11-01 ENCOUNTER — OFFICE VISIT (OUTPATIENT)
Dept: GASTROENTEROLOGY | Facility: CLINIC | Age: 47
End: 2022-11-01
Payer: COMMERCIAL

## 2022-11-01 VITALS
SYSTOLIC BLOOD PRESSURE: 110 MMHG | HEART RATE: 81 BPM | DIASTOLIC BLOOD PRESSURE: 67 MMHG | OXYGEN SATURATION: 98 % | BODY MASS INDEX: 31.08 KG/M2 | HEIGHT: 70 IN | WEIGHT: 217.1 LBS

## 2022-11-01 DIAGNOSIS — K50.00 CROHN'S DISEASE OF SMALL INTESTINE WITHOUT COMPLICATION (H): Primary | ICD-10-CM

## 2022-11-01 LAB
PATH REPORT.COMMENTS IMP SPEC: NORMAL
PATH REPORT.FINAL DX SPEC: NORMAL
PATH REPORT.GROSS SPEC: NORMAL
PATH REPORT.MICROSCOPIC SPEC OTHER STN: NORMAL
PATH REPORT.RELEVANT HX SPEC: NORMAL
PHOTO IMAGE: NORMAL

## 2022-11-01 PROCEDURE — 99215 OFFICE O/P EST HI 40 MIN: CPT | Mod: GC | Performed by: STUDENT IN AN ORGANIZED HEALTH CARE EDUCATION/TRAINING PROGRAM

## 2022-11-01 RX ORDER — BUDESONIDE 3 MG/1
CAPSULE, COATED PELLETS ORAL
Qty: 126 CAPSULE | Refills: 0 | Status: SHIPPED | OUTPATIENT
Start: 2022-11-01 | End: 2022-11-16

## 2022-11-01 ASSESSMENT — ENCOUNTER SYMPTOMS
SPUTUM PRODUCTION: 1
HEARTBURN: 1
STIFFNESS: 1
SNORES LOUDLY: 1
NERVOUS/ANXIOUS: 1
DIARRHEA: 1
DEPRESSION: 1
BACK PAIN: 1
ARTHRALGIAS: 1

## 2022-11-01 ASSESSMENT — PAIN SCALES - GENERAL: PAINLEVEL: NO PAIN (0)

## 2022-11-01 NOTE — PROGRESS NOTES
[facility]:     Follow up: question of Crohn's vs ileitis NOS    Date of first visit: 7/26/2022     Referring provider: Sujata Wheatley    CC: 47 year old male referred by Sujata Wheatley for evaluation of possible IBD    ASSESSMENT AND PLAN:    #. Chronic diarrhea  #. Abdominal pain  #. Ileitis on imaging and endosscopy  MRE results and colonoscopy from 10/31/22 concerning for small bowel crohn's disease. Possibility of NSAID enteropathy remains though patient is adamant about scant use. Risk/benefits/alternatives to initiation biologic therapy for crohn's was discussed with patient, he is agreeable. Became symptomatic within days of tapering from budesonide, which is also supportive of an inflammatory etiology.  -- pharmacy medication consultation for adalimumab  -- restart budesonide: 9 mg x 4 weeks, 6 mg x 2weeks, 3 mg x 2 weeks  -- return to clinic in 6 months  -- interval colonoscopy for disease activity assessment sometime after next clinic visit  -- can continue PRN loperamide    Return to Clinic: 6 months    Antoni Todd MD, PGY5  Gastroenterology Fellow  [facility]  See [facility] for GI on-call information    Patient discussed and seen with Gastroenterology staff Dr. Montoya, who is in agreement with the above.     ===========================================================================    HPI:     Mr. Amato presents today for evaluation of chronic diarrhea, abdominal pain, and endoscopic/imaging findings of ileitis. Likely Crohn's. Presents today for follow up after MRE/colonoscopy yesterday.       He provides the following:    On budesonide: (9, 6, and 3 mg)  - 1-2 semi formed stools per day, very little if any abdominal pain    Off budesondie:  - up to 5 loose stools per day, urgency, abdominal cramps, incontinence (has to defecate in ditches and buckets, he is often out and about for his job and has long drives between restrooms)  - abdominal pain prior to  BMs  - weakness, fatigue    Ibuprofen use:  - perhaps once to twice per week (as needed), never more    IBD ROS negative unless noted above.     Tolerated colonoscopy and MRE without issue.       ===========================================================================  HPI from original consult in 7/2022:  Diarrhea for 5+ years, no GI symptoms or illness prior to that.     Bowel movement behavior:  - severity unchanged in last 5 years  - frequency: 2-3 times per day  - nocturnal: not typically  - blood: none  - dyschezia: none  - abdominal pain: during and prior to BMs, some small relief with passage of BMs  - fecal incontinence: once every other month  - stool consistency: water with solid pieces  - associated symptoms: (-) nausea, vomiting, dysphagia, constipated, (+) weight loss ~ 20 lbs    Imodium (loperamide generic) 4 mg in AM, 2mg PM as needed.Recent course of budesonide, 9mg per day, started 3/2022. One prior course a few years ago, has been helpful.     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 Is 100% service covered for his [facility] health eligibility. Some radioactive exposure during explosive detection with certain devices.     [facility].     Family history:  - IBD: negative   - CRC: negative  - Celiac: negative  -- Autoimmune disease: negative    IBD ROS:  - Negative: joint pain, rashes, eye redness, oral sores, vision changes    Social:  - Tobacco: 1.5 ppd x 2 years, quit for 3 years, total 27-28 years  - EtOH: < 5 drinks per month  - No recreational drug use  - Works in oil field, "[employer]/engineering  - Lives: [place]    NSAIDs: uses 800 mg ibuprofen PRN for shoulder and back pain, less than 3 times per week they are actually used    Diet: simply carbohydrates, though no dense source of sugar alcohols or artificial sweeteners    Abdominal surgery history: none    Targeted GI review of systems  "complete and is otherwise negative.     No past medical history on file.    No past surgical history on file.    No family history on file.    Social History     Tobacco Use     Smoking status: Every Day     Packs/day: 1.50     Types: Cigarettes     Smokeless tobacco: Never   Substance Use Topics     Alcohol use: Not on file     Physical exam:     Vitals:/67   Pulse 81   Ht 1.778 m (5' 10\")   Wt 98.5 kg (217 lb 1.6 oz)   SpO2 98%   BMI 31.15 kg/m     BMI: Body mass index is 31.15 kg/m .      GEN: NAD, A&Ox3, appropriate  HEENT: EOMI, PERRLA, MMM, hearing grossly intact  Neck: full ROM  Cardiopulmonary: non labored, comfortable on RA, nondiaphoretic, no LE edema  Abdominal: soft, tender in lower quadrants, R>L nondistended, no HSM, no rebound, no guarding, normoactive BS  Skin: no jaundice, no gross lesions on visible skin  Neuro: A&Ox3, grossly intact motor/sensation, gait intact  MSK: no gross deformity, moves arms/legs equally  Psych: normal affect, congruent with mood      Labs:   - Fecal calprotectin: 816 (7/27/22)  - Albumin: 3.3    Relevant imaging:   CTE: abnormal mural hyperenhancementt of distal ileum and associated mesentery/LAD    MRE: 10/31/22    EXAMINATION: MR ENTEROGRAPHY W/O AND W CONTRAST, 10/31/2022 3:16 PM     TECHNIQUE:  Multiplanar, multisequence MRI imaging of the abdomen and  pelvis was obtained using MR enterography protocol without and with  intravenous gadolinium. Contrast dose: 10mL Gadavist     COMPARISON: MR 2/25/2022, CT 11/23/2020     HISTORY: 47 yom, history of diarrhea, ileitis on imaging, concern for  crohns disease; Abnormal findings on diagnostic imaging of other parts  of digestive tract; Diarrhea, unspecified type     FINDINGS:     Exam quality: Diagnostic     Small bowel:Segmental mural early and late phase hyperenhancement with  moderate asymmetric thickening of the distal ileum, measuring about 22  cm in length with mild luminal narrowing and mild upstream " dilatation  (18:32,27:31). There is relative sparing of the most terminal ileum 4  approximately 12-15 cm. There is also increased signal on DWI images  in the terminal ileum segment. There are at least 4 areas of similar  involvement more proximally within the distal ileum approximately 25  cm in length with intervening short segments of mildly dilated  uninflamed bowel indicting stricture of involved segments. Involved  segmental flanks are similar with previous MRI on 2/25/2022. Though  the amount of inflammation is slightly improved, and some mild  inflammation of the terminal ileum has nearly resolved with only  minimal residual terminal ileal hyperenhancement.  There is involvement of large bowel particularly seen long segment of  sigmoid and descending colon (approximately 40 cm) with moderate wall  thickening, submucosal edema, hypervascularity, enhancement and  restricted diffusion.   No fistulization or inflammatory mass. No drainable collection. No  free fluid.  Enlarged adjacent mesenteric lymph nodes, measuring up to 1 cm  (18:28). Diffuse hypervascularity involved small and large bowel  segments.     Remainder of exam: Splenomegaly. A few tiny liver cysts. Mild  pancreatic atrophy. Gallbladder, biliary tract, bilateral adrenal  glands, bilateral kidneys and collecting system are unremarkable.  Patent major vasculature. No aggressive or acute osseous structure.                                                                      IMPRESSION:  1. Several areas of long segment involvement (approximately 50 cm in  total) of small bowel with intervening mildly dilated otherwise normal  appearing segments and demonstrating signs of acute on chronic  inflammation and multifocal mild stricturing highly suggestive of  Crohn's disease. Overall, the degree of active inflammation is  slightly improved compared to 2/25/2022 although similar in location  and length.  1a. Additionally there is long segment involvement  of large bowel  (approximately 25 cm) with similar imaging characteristics.  1b. Multiple likely reactive mesenteric lymph nodes.  2. Splenomegaly.     I have personally reviewed the examination and initial interpretation  and I agree with the findings.     CHRIS CORREA MD         SYSTEM ID:  [id number]    Endoscopy:    Colonoscopy: 10/31/22    Findings:        The perianal and digital rectal examinations were normal.        The distal 30 cm of the ileum was evaluated. There were 4 areas of mild        luminal narrowing (stricture). These areas were focal with concentric        ulcerations. The ileum between these areas appeared largely normal. The        distal 3-5 cm of the ileum was diffusely congested with some 3-4 mm        discrete ulcers. The areas of mild stricture were at the IC valve, then        10 cm proximal to the IC valve, 20 cm proximal to the IC valve and 30 cm        proximal to the IC valve. All of these mild strictures were < 1cm in        length and were passed easily with an adult colonoscope. They did have        an appearance that could be consistent with NSAID enteropathy. Crohn's        Disease is also on the differential.Biopsies were taken with a cold        forceps for histology.    Impression:            - Preparation of the colon was fair.                          - Ileitis, rule out Crohn's disease vs NSAID                          enteropathy. Biopsied. See discussion above                          - Diverticulosis in the sigmoid colon and in the                          descending colon.                          - The examination was otherwise normal on direct and                          retroflexion views.                          - Biopsies were taken with a cold forceps from the                          right colon and left colon for evaluation of                          microscopic colitis.   Recommendation:        - Discharge patient to home (with escort).                           - Resume previous diet.                          - Continue present medications.                          - Await pathology results.                          - Return to GI clinic tomorrow.                          - Obtain scheduled MRE       Pathology:     Final Diagnosis   A.  ILEUM, BIOPSY:  - Active ileitis with erosion; see comment    B.  COLON, RIGHT, BIOPSY:  - Focal active colitis; see comment    C.  COLON, LEFT, BIOPSY:  - Unremarkable colonic mucosa  - No significant acute cryptitis, chronic changes, or microscopic colitis identified    D.  RECTUM, BIOPSY:  - Unremarkable rectal mucosa  - No evidence of microscopic, chronic, or active proctitis        Electronically signed by Ronni Christianson DO on 11/1/2022 at  8:05 AM   Comment  [facility]   Sections of ileal mucosa show acute cryptitis with erosion.  Chronic changes such as significant architectural distortion or metaplastic changes are not identified.  Increased intraepithelial lymphocytes are not seen.  Sections of the right colon show focal focal acute cryptitis.  Epithelioid granulomas, dysplasia, or malignancy are not identified.  These nonspecific findings may represent a drug injury or an infectious process.  Idiopathic inflammatory bowel disease is less likely; however, it cannot be completely ruled out histologically.   Clinical Information  [facility]   Chronic diarrhea           Relevant surgical reports:   N/A    ROS for today's visit:  Answers for HPI/ROS submitted by the patient on 11/1/2022  General Symptoms: No  Skin Symptoms: No  HENT Symptoms: No  EYE SYMPTOMS: No  HEART SYMPTOMS: No  LUNG SYMPTOMS: Yes  INTESTINAL SYMPTOMS: Yes  URINARY SYMPTOMS: No  REPRODUCTIVE SYMPTOMS: No  SKELETAL SYMPTOMS: Yes  BLOOD SYMPTOMS: No  NERVOUS SYSTEM SYMPTOMS: No  MENTAL HEALTH SYMPTOMS: Yes  Sputum or phlegm: Yes  Snoring: Yes  Heart burn or indigestion: Yes  Diarrhea: Yes  Back pain: Yes  Joint pain: Yes  Joint stiffness: Yes  Nervous or Anxious:  Yes  Depression: Yes

## 2022-11-01 NOTE — PATIENT INSTRUCTIONS
"Jacinda,     It was good to see you in clinic. Please see below for our plan.     Basically, we think that you more than likely have Crohn's disease, which is a type of inflammatory bowel disease that impacts your intestine. In your case, your disease is located mostly in your small intestine. We think that using a medication class called a \"biologic\" is indicated for you.    We will arrange a pharmacy video visit with you to provide education and details regarding our medication of choice, adalimumab  We will arrange to start budesonide again as follows: 9 mg for 4 weeks (three pills), 6 mg for 2 weeks (two pills), 3 mg for 2 weeks (one pill), then off  Use adalimumab as instructed, our pharmacist and our care team can assist with instructions at any time  We will follow up in 6 months (video or in person)  We will repeat colonoscopy after next clinic visit (we can discuss scheduling at that time)  If you haven't heard about your adalimumab (humira) by 11/10/22, please reach out and I will [facility] with the [facility]    Victor Manuel,  Dr. Todd and Dr. Montoya    See below to see how you can contact us.     ======    It was a pleasure seeing you in clinic today - please be in touch if there are any further questions that arise following today's visit.  During business hours, you may reach my Clinic Coordinator at [phone].  For urgent/emergent questions after business hours, you may reach the on-call GI Fellow by contacting the [facility]  at [phone].    Any benign/non-urgent test results are usually communicated via letter or EventRegisthart message within 1-2 weeks after completion.  Urgent results (those that require a change in the previously-discussed care plan) are usually communicated via a phone call once available from our clinic staff to discuss the results and the next steps in your evaluation.    I recommend signing up for [employer] access if you have not already done so and are comfortable with " using a computer.  This allows for online access to your lab results and also helps you communicate efficiently with my clinic should any questions arise in your care.

## 2022-11-01 NOTE — LETTER
11/1/2022         RE: Jacinda Amato  [address] [facility] [address]        Dear Colleague,    Thank you for referring your patient, Jacinda Amato, to the [facility] [place]. Please see a copy of my visit note below.    [facility]:     Follow up: question of Crohn's vs ileitis NOS    Date of first visit: 7/26/2022     Referring provider: Sujata Wheatley    CC: 47 year old male referred by Sujata Wheatley for evaluation of possible IBD    ASSESSMENT AND PLAN:    #. Chronic diarrhea  #. Abdominal pain  #. Ileitis on imaging and endosscopy  MRE results and colonoscopy from 10/31/22 concerning for small bowel crohn's disease. Possibility of NSAID enteropathy remains though patient is adamant about scant use. Risk/benefits/alternatives to initiation biologic therapy for crohn's was discussed with patient, he is agreeable. Became symptomatic within days of tapering from budesonide, which is also supportive of an inflammatory etiology.  -- pharmacy medication consultation for adalimumab  -- restart budesonide: 9 mg x 4 weeks, 6 mg x 2weeks, 3 mg x 2 weeks  -- return to clinic in 6 months  -- interval colonoscopy for disease activity assessment sometime after next clinic visit  -- can continue PRN loperamide    Return to Clinic: 6 months    Antoni Todd MD, PGY5  Gastroenterology Fellow  [facility]  See [facility]/bryn for GI on-call information    Patient discussed and seen with Gastroenterology staff Dr. Montoya, who is in agreement with the above.     ===========================================================================    HPI:     Mr. Amato presents today for evaluation of chronic diarrhea, abdominal pain, and endoscopic/imaging findings of ileitis. Likely Crohn's. Presents today for follow up after MRE/colonoscopy yesterday.       He provides the following:    On budesonide: (9, 6, and 3 mg)  - 1-2 semi formed stools per day, very  little if any abdominal pain    Off budnikkieie:  - up to 5 loose stools per day, urgency, abdominal cramps, incontinence (has to defecate in ditches and buckets, he is often out and about for his job and has long drives between restrooms)  - abdominal pain prior to BMs  - weakness, fatigue    Ibuprofen use:  - perhaps once to twice per week (as needed), never more    IBD ROS negative unless noted above.     Tolerated colonoscopy and MRE without issue.       ===========================================================================  HPI from original consult in 7/2022:  Diarrhea for 5+ years, no GI symptoms or illness prior to that.     Bowel movement behavior:  - severity unchanged in last 5 years  - frequency: 2-3 times per day  - nocturnal: not typically  - blood: none  - dyschezia: none  - abdominal pain: during and prior to BMs, some small relief with passage of BMs  - fecal incontinence: once every other month  - stool consistency: water with solid pieces  - associated symptoms: (-) nausea, vomiting, dysphagia, constipated, (+) weight loss ~ 20 lbs    Imodium (loperamide generic) 4 mg in AM, 2mg PM as needed.Recent course of budesonide, 9mg per day, started 3/2022. One prior course a few years ago, has been helpful.     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 Is 100% service covered for his [facility]. Some radioactive exposure during explosive detection with certain devices.     Patient consents for me to access CPR[facility] records for continuity of care.     Family history:  - IBD: negative   - CRC: negative  - Celiac: negative  -- Autoimmune disease: negative    IBD ROS:  - Negative: joint pain, rashes, eye redness, oral sores, vision changes    Social:  - Tobacco: 1.5 ppd x 2 years, quit for 3 years, total 27-28 years  - EtOH: < 5 drinks per month  - No recreational drug use  - Works in oil field, [employer]/engineering  - Lives: [place]    NSAIDs: uses  "800 mg ibuprofen PRN for shoulder and back pain, less than 3 times per week they are actually used    Diet: simply carbohydrates, though no dense source of sugar alcohols or artificial sweeteners    Abdominal surgery history: none    Targeted GI review of systems complete and is otherwise negative.     No past medical history on file.    No past surgical history on file.    No family history on file.    Social History     Tobacco Use     Smoking status: Every Day     Packs/day: 1.50     Types: Cigarettes     Smokeless tobacco: Never   Substance Use Topics     Alcohol use: Not on file     Physical exam:     Vitals:/67   Pulse 81   Ht 1.778 m (5' 10\")   Wt 98.5 kg (217 lb 1.6 oz)   SpO2 98%   BMI 31.15 kg/m     BMI: Body mass index is 31.15 kg/m .      GEN: NAD, A&Ox3, appropriate  HEENT: EOMI, PERRLA, MMM, hearing grossly intact  Neck: full ROM  Cardiopulmonary: non labored, comfortable on RA, nondiaphoretic, no LE edema  Abdominal: soft, tender in lower quadrants, R>L nondistended, no HSM, no rebound, no guarding, normoactive BS  Skin: no jaundice, no gross lesions on visible skin  Neuro: A&Ox3, grossly intact motor/sensation, gait intact  MSK: no gross deformity, moves arms/legs equally  Psych: normal affect, congruent with mood      Labs:   - Fecal calprotectin: 816 (7/27/22)  - Albumin: 3.3    Relevant imaging:   CTE: abnormal mural hyperenhancementt of distal ileum and associated mesentery/LAD    MRE: 10/31/22    EXAMINATION: MR ENTEROGRAPHY W/O AND W CONTRAST, 10/31/2022 3:16 PM     TECHNIQUE:  Multiplanar, multisequence MRI imaging of the abdomen and  pelvis was obtained using MR enterography protocol without and with  intravenous gadolinium. Contrast dose: 10mL Gadavist     COMPARISON: MR 2/25/2022, CT 11/23/2020     HISTORY: 47 yom, history of diarrhea, ileitis on imaging, concern for  crohns disease; Abnormal findings on diagnostic imaging of other parts  of digestive tract; Diarrhea, unspecified " type     FINDINGS:     Exam quality: Diagnostic     Small bowel:Segmental mural early and late phase hyperenhancement with  moderate asymmetric thickening of the distal ileum, measuring about 22  cm in length with mild luminal narrowing and mild upstream dilatation  (18:32,27:31). There is relative sparing of the most terminal ileum 4  approximately 12-15 cm. There is also increased signal on DWI images  in the terminal ileum segment. There are at least 4 areas of similar  involvement more proximally within the distal ileum approximately 25  cm in length with intervening short segments of mildly dilated  uninflamed bowel indicting stricture of involved segments. Involved  segmental flanks are similar with previous MRI on 2/25/2022. Though  the amount of inflammation is slightly improved, and some mild  inflammation of the terminal ileum has nearly resolved with only  minimal residual terminal ileal hyperenhancement.  There is involvement of large bowel particularly seen long segment of  sigmoid and descending colon (approximately 40 cm) with moderate wall  thickening, submucosal edema, hypervascularity, enhancement and  restricted diffusion.   No fistulization or inflammatory mass. No drainable collection. No  free fluid.  Enlarged adjacent mesenteric lymph nodes, measuring up to 1 cm  (18:28). Diffuse hypervascularity involved small and large bowel  segments.     Remainder of exam: Splenomegaly. A few tiny liver cysts. Mild  pancreatic atrophy. Gallbladder, biliary tract, bilateral adrenal  glands, bilateral kidneys and collecting system are unremarkable.  Patent major vasculature. No aggressive or acute osseous structure.                                                                      IMPRESSION:  1. Several areas of long segment involvement (approximately 50 cm in  total) of small bowel with intervening mildly dilated otherwise normal  appearing segments and demonstrating signs of acute on  chronic  inflammation and multifocal mild stricturing highly suggestive of  Crohn's disease. Overall, the degree of active inflammation is  slightly improved compared to 2/25/2022 although similar in location  and length.  1a. Additionally there is long segment involvement of large bowel  (approximately 25 cm) with similar imaging characteristics.  1b. Multiple likely reactive mesenteric lymph nodes.  2. Splenomegaly.     I have personally reviewed the examination and initial interpretation  and I agree with the findings.     CHRIS CORREA MD         SYSTEM ID:  [id number]    Endoscopy:    Colonoscopy: 10/31/22    Findings:        The perianal and digital rectal examinations were normal.        The distal 30 cm of the ileum was evaluated. There were 4 areas of mild        luminal narrowing (stricture). These areas were focal with concentric        ulcerations. The ileum between these areas appeared largely normal. The        distal 3-5 cm of the ileum was diffusely congested with some 3-4 mm        discrete ulcers. The areas of mild stricture were at the IC valve, then        10 cm proximal to the IC valve, 20 cm proximal to the IC valve and 30 cm        proximal to the IC valve. All of these mild strictures were < 1cm in        length and were passed easily with an adult colonoscope. They did have        an appearance that could be consistent with NSAID enteropathy. Crohn's        Disease is also on the differential.Biopsies were taken with a cold        forceps for histology.    Impression:            - Preparation of the colon was fair.                          - Ileitis, rule out Crohn's disease vs NSAID                          enteropathy. Biopsied. See discussion above                          - Diverticulosis in the sigmoid colon and in the                          descending colon.                          - The examination was otherwise normal on direct and                          retroflexion views.                           - Biopsies were taken with a cold forceps from the                          right colon and left colon for evaluation of                          microscopic colitis.   Recommendation:        - Discharge patient to home (with escort).                          - Resume previous diet.                          - Continue present medications.                          - Await pathology results.                          - Return to GI clinic tomorrow.                          - Obtain scheduled MRE       Pathology:     Final Diagnosis   A.  ILEUM, BIOPSY:  - Active ileitis with erosion; see comment    B.  COLON, RIGHT, BIOPSY:  - Focal active colitis; see comment    C.  COLON, LEFT, BIOPSY:  - Unremarkable colonic mucosa  - No significant acute cryptitis, chronic changes, or microscopic colitis identified    D.  RECTUM, BIOPSY:  - Unremarkable rectal mucosa  - No evidence of microscopic, chronic, or active proctitis        Electronically signed by Ronni Christianson DO on 11/1/2022 at  8:05 AM   Comment  [facility]   Sections of ileal mucosa show acute cryptitis with erosion.  Chronic changes such as significant architectural distortion or metaplastic changes are not identified.  Increased intraepithelial lymphocytes are not seen.  Sections of the right colon show focal focal acute cryptitis.  Epithelioid granulomas, dysplasia, or malignancy are not identified.  These nonspecific findings may represent a drug injury or an infectious process.  Idiopathic inflammatory bowel disease is less likely; however, it cannot be completely ruled out histologically.   Clinical Information  [facility]   Chronic diarrhea           Relevant surgical reports:   N/A    ROS for today's visit:  Answers for HPI/ROS submitted by the patient on 11/1/2022  General Symptoms: No  Skin Symptoms: No  HENT Symptoms: No  EYE SYMPTOMS: No  HEART SYMPTOMS: No  LUNG SYMPTOMS: Yes  INTESTINAL SYMPTOMS: Yes  URINARY SYMPTOMS: No  REPRODUCTIVE  SYMPTOMS: No  SKELETAL SYMPTOMS: Yes  BLOOD SYMPTOMS: No  NERVOUS SYSTEM SYMPTOMS: No  MENTAL HEALTH SYMPTOMS: Yes  Sputum or phlegm: Yes  Snoring: Yes  Heart burn or indigestion: Yes  Diarrhea: Yes  Back pain: Yes  Joint pain: Yes  Joint stiffness: Yes  Nervous or Anxious: Yes  Depression: Yes                Attestation signed by Suman Montoya MD at 11/15/2022  3:13 PM:  ATTENDING ATTESTATION:     DATE SEEN: 11/1/22    Patient was discussed, seen, and examined by me, Suman Montoya. The plan of care and pertinent data/imaging were reviewed with Dr. Todd. I agree with the assessment and plan as delineated above with the following additions:     Pt has moderate to severe ileitis. Time course and appearance are very suggestive of Crohn's Disease. Tissue shows only active changes. However, inflammation has now been present for many months with is consistent with chronic. Overall, I think this is most consistent with Crohn's ileitis. No evidence of malignancy, lymphoma or chronic infection. Other main differential includes NSAIDs but patient is adamant he uses this only rarely. Additionally, he reports significant improvement in symptoms with use of Entocort. Therefore, will move forward with adalimumab therapy. Will need follow up colonoscopy to check for healing. He needs to avoid all NSAIDs.    Thank you for this consultation.  It was a pleasure to participate in the care of this patient; please contact us with any further questions.  I spent a total of 40 minutes during the day of encounter performed chart review, meeting with patient, patient counseling, care coordination, and documentation.      Please contact me with any further questions.    Suman Montoya MD  [facility]  Division of Gastroenterology, Hepatology and Nutrition        Again, thank you for allowing me to participate in the care of your patient.        Sincerely,        Antoni Todd MD

## 2022-11-01 NOTE — LETTER
Date:November 15, 2022      Provider requested that no letter be sent. Do not send.       [facility]

## 2022-11-02 ENCOUNTER — TELEPHONE (OUTPATIENT)
Dept: GASTROENTEROLOGY | Facility: CLINIC | Age: 47
End: 2022-11-02

## 2022-11-02 DIAGNOSIS — K50.00 CROHN'S DISEASE OF SMALL INTESTINE WITHOUT COMPLICATION (H): Primary | ICD-10-CM

## 2022-11-02 NOTE — TELEPHONE ENCOUNTER
Humira orders placed. Inbasket sent to initiate prior authorization. Insurance through [facility]. Standing labs ordered; [facility] to [facility], per patient request. Required labs completed prior. MTM referral placed by MD, patient aware.

## 2022-11-02 NOTE — TELEPHONE ENCOUNTER
----- Message from Antoni Todd MD sent at 11/1/2022  4:28 PM CDT -----  Regarding: Adalimumab (humira) start for small bowel crohns  Hello all,     Just an FYI that I will be starting adalimumab for Jacinda. Risk/benefit discussion complete in clinic today. He is excited for his pharmacy MTM visit.     Let me know if I can help with anything.     Best,  Antoni

## 2022-11-03 ENCOUNTER — TELEPHONE (OUTPATIENT)
Dept: GASTROENTEROLOGY | Facility: CLINIC | Age: 47
End: 2022-11-03

## 2022-11-03 ENCOUNTER — VIRTUAL VISIT (OUTPATIENT)
Dept: PHARMACY | Facility: CLINIC | Age: 47
End: 2022-11-03
Attending: INTERNAL MEDICINE
Payer: COMMERCIAL

## 2022-11-03 DIAGNOSIS — K50.00 CROHN'S DISEASE OF SMALL INTESTINE WITHOUT COMPLICATION (H): ICD-10-CM

## 2022-11-03 DIAGNOSIS — K50.00 CROHN'S DISEASE OF SMALL INTESTINE WITHOUT COMPLICATION (H): Primary | ICD-10-CM

## 2022-11-03 PROCEDURE — 99207 PR NO CHARGE LOS: CPT | Performed by: PHARMACIST

## 2022-11-03 NOTE — TELEPHONE ENCOUNTER
PA Initiation    Medication: Humira PA initiated  Insurance Company: [employer] - Phone [phone] Fax [phone]  Pharmacy Filling the Rx:    Filling Pharmacy Phone:    Filling Pharmacy Fax:    Start Date: 11/3/2022

## 2022-11-03 NOTE — PROGRESS NOTES
Clinical Pharmacy Consult:                                                    Jacinda Amato is a 47 year old male called for a clinical pharmacist consult.  He was referred to me from Dr. Todd.     Jacinda is not seen for CMR today due to coverage.    Reason for Consult: Humira education    Discussion:     Jacinda was recently seen by Melia Todd/Jan with the following assessment/plan:    Ileitis on imaging and endosscopy  MRE results and colonoscopy from 10/31/22 concerning for small bowel crohn's disease. Possibility of NSAID enteropathy remains though patient is adamant about scant use. Risk/benefits/alternatives to initiation biologic therapy for crohn's was discussed with patient, he is agreeable. Became symptomatic within days of tapering from budesonide, which is also supportive of an inflammatory etiology.  -- pharmacy medication consultation for adalimumab  -- restart budesonide: 9 mg x 4 weeks, 6 mg x 2weeks, 3 mg x 2 weeks  -- return to clinic in 6 months  -- interval colonoscopy for disease activity assessment sometime after next clinic visit  -- can continue PRN loperamide    We reviewed Humira today including mechanism of action, degree of immunosuppression, general dosing/administration, potential for drug antibodies/therapeutic drug monitoring, side effects (both common/serious), precautions, monitoring for safety and efficacy as well as time to efficacy. Encouraged formal injection training, but he lives far away from clinic. Notes his sister-in-law is a nurse and he is also willing to go into the clinic near his house for injection training. Reviewed need to avoid LIVE vaccines and potential need to hold medication in the setting of signs/symptoms of infection. Reviewed recommendation for annual skin checks. 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 He feels this could cloud his perception of side effects, but he agrees to reach out if something  seems to be worsening while on therapy. His Humira is not yet authorized at the time of our call.    Of note, reviewed that he does not have standing labs available. [facility].    IBD Health Care Maintenance:    Vaccinations:  All patients on biologics should avoid live vaccines.    -- Influenza (every year) has not gotten   -- TdaP (every 10 years) 10/11/2012  -- Pneumococcal Pneumonia    Prevnar-13: not on file    Pneumovax-23: 10/26/2010   -- COVID-19 declined   -- Yearly assessment for latent Tb (verbal screening and exam, PPD or QuantiFERON-Tb testing)      -- negative 7/26/2022    One time confirmation of immunity or serologies:  -- Hepatitis A (serologies or immunizations) not on file   -- Hepatitis B (serologies or immunizations) serologies 2022 do not indicate immunity  -- Varicella/Zoster not on file    Due to the planned immunosuppression in this patient, I would not advise administration of live vaccines such as varicella/VZV, intranasal influenza, MMR, or yellow fever vaccine (if traveling).      Pre-Biologic Screening:  -- Hep B Surface Antibody serologies do not indicate immunity 2022  -- Hep B Surface Antigen non-reactive 7/2022  -- Hep B Core Antibody non-reactive 7/2022    Cancer Screening:  Skin cancer screening: Annual visual exam of skin by dermatologist since patient is immunocompromised    Misc:  -- Avoid tobacco use  -- Avoid NSAIDs as there is potentially a 25% chance of causing an IBD flare      Plan:  1. Jacinda to consider the following vaccines:   -- Prevnar-20 (for pneumonia)   -- Hepatitis B series   -- Shingrix series (for Shingles)   -- tetanus booster   -- influenza vaccine (not the nasal spray)   -- COVID-19 series (declined)     2. Prefers standing labs to [facility] in [place], [place]   -- Sees Renetta Avery    -- Discussed with RNCC after visit who will help coordinate this    3. We will be in touch when we know more about the Humira authorization.    Nemo Jimenez  PharmD, BCACP  MTM Pharmacist   [facility]   Phone: [phone]

## 2022-11-04 ENCOUNTER — DOCUMENTATION ONLY (OUTPATIENT)
Dept: GASTROENTEROLOGY | Facility: CLINIC | Age: 47
End: 2022-11-04

## 2022-11-04 NOTE — PROGRESS NOTES
Lab orders for Hepatic, ESR, CRP, and CBC printed [facility] to the [facility] [place] at [phone].

## 2022-11-04 NOTE — PATIENT INSTRUCTIONS
Plan:  1. Jacinda to consider the following vaccines:   -- Prevnar-20 (for pneumonia)   -- Hepatitis B series   -- Shingrix series (for Shingles)   -- tetanus booster   -- influenza vaccine (not the nasal spray)   -- COVID-19 series (declined)     2. Prefers standing labs to [facility] in [place], [place]   -- Sees Renetta Avery    -- Discussed with RNCC after visit who will help coordinate this    3. We will be in touch when we know more about the Humira authorization.    Nemo Jimenez PharmD, BCACP  MTM Pharmacist   [facility]   Phone: [phone]

## 2022-11-16 RX ORDER — BUDESONIDE 3 MG/1
CAPSULE, COATED PELLETS ORAL
Qty: 126 CAPSULE | Refills: 0 | Status: SHIPPED | OUTPATIENT
Start: 2022-11-16 | End: 2023-01-11

## 2022-11-16 NOTE — TELEPHONE ENCOUNTER
Called and spoke with [facility] and they said that they can fill the Humira.  They just needed to have the offce visit notes.  I faxed the last two office visit notes to them at [phone] and released both the 80mg and 40mg prescrptions electronically to the [facility].

## 2022-11-18 ENCOUNTER — TELEPHONE (OUTPATIENT)
Dept: GASTROENTEROLOGY | Facility: CLINIC | Age: 47
End: 2022-11-18

## 2022-11-18 NOTE — TELEPHONE ENCOUNTER
Brief GI Phone Clinic Note    11/17/22    Reached out to Jacinda blackman regarding an erroneous letter sent to him from our office (see letters tab in Epic).     Explained the administrative error and that we in fact would not keep clinical information from him at any point during his care. Provided that we would investigate this process to assess how it could have happened and will certainly seek to prevent future instances.     All questions answered, patient appreciative of the call.     Antoni Todd MD

## 2022-11-22 ENCOUNTER — TELEPHONE (OUTPATIENT)
Dept: PHARMACY | Facility: CLINIC | Age: 47
End: 2022-11-22

## 2022-11-22 NOTE — TELEPHONE ENCOUNTER
Jacinda called to say he got the Humira delivery today, and is wondering next steps. Notes that he knows there is supposed to be some kind of [facility] [place]. He talked to his local contact, because he was in there getting vaccines today, and they said they would have no problem doing the teach up there. This would be fine with us, as long as he is getting some kind of training before attempting to give Humira. He has a basic understanding of the process.    Confirmed that he got the starter pack - however, he notes he only got two pens. Confirms they are the 40 mg pens which I clarified are the maintenance dose pack. Encouraged him to put the maintenance pack back into the refrigerator, as he will use those at a later date. He would appreciate if I could clarify the status of the starter pack. We reviewed that the starter pack should be three 80 mg pens, take 160 mg (two pens) on the first day and then 80 mg (one pen) two weeks later. He would appreciate a call back as opposed to MyChart message.    I contacted the [facility] pharmacy. They note that the starter pack was mailed out on 11/18. They check tracking and confirm this is still in process to him. They confirmed that they did ship out both the starter and maintenance pack orders, but they must have arrived separately.     I returned call to Jacinda. Left non-detailed message stating order we previously discussed is in process to him, and encouraged him to call me back if he has further questions.

## 2022-12-21 ENCOUNTER — TELEPHONE (OUTPATIENT)
Dept: GASTROENTEROLOGY | Facility: CLINIC | Age: 47
End: 2022-12-21

## 2023-01-09 NOTE — TELEPHONE ENCOUNTER
Returned call and stated they have not received order. Given fax number. Verified correct fax number and order was refaxed to [phone].

## 2023-01-22 ENCOUNTER — MYC MEDICAL ADVICE (OUTPATIENT)
Dept: GASTROENTEROLOGY | Facility: CLINIC | Age: 48
End: 2023-01-22

## 2023-01-25 ENCOUNTER — TELEPHONE (OUTPATIENT)
Dept: GASTROENTEROLOGY | Facility: CLINIC | Age: 48
End: 2023-01-25

## 2023-01-25 ENCOUNTER — MYC MEDICAL ADVICE (OUTPATIENT)
Dept: GASTROENTEROLOGY | Facility: CLINIC | Age: 48
End: 2023-01-25

## 2023-01-25 NOTE — TELEPHONE ENCOUNTER
Attempted to contact the patient to discuss the [employer] message that Dr. Todd had sent and also inquire about Humira teaching. No answer, left detailed message and also sent [employer].

## 2023-01-25 NOTE — TELEPHONE ENCOUNTER
----- Message from Antoni Todd MD sent at 1/25/2023  6:36 AM CST -----  Regarding: Kristy phone call  Conor,     I'll call Jacinda in the next few days. His work schedule is crazy so I sent him a MyChart to arrange a good time.     Do we know if the [facility] ever did [facility] with him? Thanks.     Antoni

## 2023-01-27 ENCOUNTER — TRANSFERRED RECORDS (OUTPATIENT)
Dept: HEALTH INFORMATION MANAGEMENT | Facility: CLINIC | Age: 48
End: 2023-01-27

## 2023-01-27 LAB
ALT SERPL-CCNC: 9 U/L (ref 0–41)
AST SERPL-CCNC: 8 U/L (ref 0–40)

## 2023-01-30 NOTE — CONFIDENTIAL NOTE
Contacted the patient to discuss next steps with Humira and available times to discuss concerns with Dr. Todd.   Patient apologized for not getting back to Dr. Todd and myself, as he has been very busy. Patient agreed to respond to Dr. Todd's message with some available slots of time to talk via telephone.   Patient reported that he did not move forward with any of the teaching with the [facility] through the [facility] since he began having hesitations taking the medication. Writer informed patient that if he decides to move forward with the medication, and has any issues with the [facility] we can offer in-person or virtual nurse visits free of charge to provide teaching.

## 2023-02-05 ENCOUNTER — TELEPHONE (OUTPATIENT)
Dept: GASTROENTEROLOGY | Facility: CLINIC | Age: 48
End: 2023-02-05

## 2023-02-05 DIAGNOSIS — K50.10 CROHN'S DISEASE OF LARGE INTESTINE WITHOUT COMPLICATION (H): Primary | ICD-10-CM

## 2023-02-05 RX ORDER — BUDESONIDE 3 MG/1
9 CAPSULE, COATED PELLETS ORAL EVERY MORNING
Qty: 90 CAPSULE | Refills: 3 | Status: SHIPPED | OUTPATIENT
Start: 2023-02-05 | End: 2023-06-05

## 2023-02-06 NOTE — TELEPHONE ENCOUNTER
"GI Clinic Telephone Call:     Phone call: 30 minutes    2/5/23    Call Discussion:     Patient provides that he is having second thoughts about the adalimumab. They include a number of issues such as that he doesn't like the concept of being careful with a suppressed immune system.  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  He worries about lung illness in general, the risk for malignancy, and occupational hazards he may face. He has a premonition that health issues are on the horizon for him and does not want adalimumab to hasten them. Also worried about a recalcitrant toe fungus on both great toes.     As it relates to his current GI symptoms, they have gotten \"better\" since fully stopping NSAIDs and he has tried to \"stretch\" out his budesonide prescription. Currently takin 3 mg per day and has maybe 2 weeks left. Pain and loose stools continue thoogh lessened.     He also worries that his care his fragmented and that he may in fact not have a [facility] primary [facility] at this time. He wonders about the possibility of me providing some of his miscellaneous cares and IBD-adjacent cares via the [facility] system. We have discussed the notion of my status as a [facility] at the [facility] previously. He admits his medications and lab monitoring are both much easier to acquire/access  via the [facility].     He would like more time to think about the adalimumab. He welcomes any patient literature or educational information that I can provide. He would like a short course of increased budesonide as well. Asks if I could look into GI and overall cares via the [facility] in [place].     All questions answered, plan of care understood from GI standpoint.     Plan:  -- discuss plan with Dr. Montoya  -- refill budesonide 9 mg per day, 90 day max  -- hold on filling adalimumab at this time  -- send adalimumab educational information  -- " check into patient access for GI specialty care in the [facility] per his request  -- follow up later this week via phone    Antoni Todd MD, PGY5  Gastroenterology Fellow  [facility]  See [facility] for GI on-call information     Case discussed with Dr. Montoya, who is in agreement with the above.

## 2023-02-21 ENCOUNTER — TELEPHONE (OUTPATIENT)
Dept: GASTROENTEROLOGY | Facility: CLINIC | Age: 48
End: 2023-02-21

## 2023-02-22 NOTE — TELEPHONE ENCOUNTER
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 He has a number of humira pens in his refrigerator.      Currently taking 9 mg of budesonide daily. Symptoms well controlled at this time. Stools per day, 1. Solid, formed. Denies blood and pain.  Understands that this is only a short term solution and that it has no evidence as a maintenance therapy in crohn's disease.     As it relates to [facility] to the [facility], he would like to move forward. I have discussed with the chief of the GI division at [facility] and will make arrangements soon.     All questions answered, plan of care understood.    Antoni Todd MD, PGY5  Gastroenterology Fellow  [facility]  See [facility] for GI on-call information

## 2023-05-24 ENCOUNTER — TRANSFERRED RECORDS (OUTPATIENT)
Dept: HEALTH INFORMATION MANAGEMENT | Facility: CLINIC | Age: 48
End: 2023-05-24

## 2023-10-22 ENCOUNTER — HEALTH MAINTENANCE LETTER (OUTPATIENT)
Age: 48
End: 2023-10-22

## 2024-12-14 ENCOUNTER — HEALTH MAINTENANCE LETTER (OUTPATIENT)
Age: 49
End: 2024-12-14

## (undated) DEVICE — TUBING SUCTION 12"X1/4" N612

## (undated) DEVICE — KIT ENDO TURNOVER/PROCEDURE CARRY-ON 101822

## (undated) DEVICE — SOL WATER IRRIG 500ML BOTTLE 2F7113

## (undated) DEVICE — ENDO FORCEP BX CAPTURA PRO SPIKE G50696

## (undated) DEVICE — SUCTION MANIFOLD NEPTUNE 2 SYS 1 PORT 702-025-000

## (undated) DEVICE — KIT ENDO FIRST STEP DISINFECTANT 200ML W/POUCH EP-4

## (undated) DEVICE — GOWN IMPERVIOUS 2XL BLUE

## (undated) DEVICE — SNARE CAPIVATOR ROUND COLD SNR BX10 M00561101

## (undated) DEVICE — SPECIMEN CONTAINER 3OZ W/FORMALIN 59901